# Patient Record
Sex: MALE | HISPANIC OR LATINO | Employment: PART TIME | URBAN - NONMETROPOLITAN AREA
[De-identification: names, ages, dates, MRNs, and addresses within clinical notes are randomized per-mention and may not be internally consistent; named-entity substitution may affect disease eponyms.]

---

## 2022-06-06 PROCEDURE — 96374 THER/PROPH/DIAG INJ IV PUSH: CPT

## 2022-06-06 PROCEDURE — 99283 EMERGENCY DEPT VISIT LOW MDM: CPT

## 2022-06-06 PROCEDURE — 96375 TX/PRO/DX INJ NEW DRUG ADDON: CPT

## 2022-06-07 ENCOUNTER — HOSPITAL ENCOUNTER (EMERGENCY)
Facility: HOSPITAL | Age: 32
Discharge: HOME | End: 2022-06-07
Attending: EMERGENCY MEDICINE | Admitting: EMERGENCY MEDICINE

## 2022-06-07 ENCOUNTER — APPOINTMENT (OUTPATIENT)
Dept: CT IMAGING | Facility: HOSPITAL | Age: 32
End: 2022-06-07

## 2022-06-07 VITALS
HEIGHT: 65 IN | SYSTOLIC BLOOD PRESSURE: 121 MMHG | WEIGHT: 130 LBS | RESPIRATION RATE: 16 BRPM | OXYGEN SATURATION: 99 % | TEMPERATURE: 97.8 F | BODY MASS INDEX: 21.66 KG/M2 | DIASTOLIC BLOOD PRESSURE: 79 MMHG | HEART RATE: 82 BPM

## 2022-06-07 DIAGNOSIS — K21.9 GASTROESOPHAGEAL REFLUX DISEASE, UNSPECIFIED WHETHER ESOPHAGITIS PRESENT: Primary | ICD-10-CM

## 2022-06-07 DIAGNOSIS — K64.9 HEMORRHOIDS, UNSPECIFIED HEMORRHOID TYPE: ICD-10-CM

## 2022-06-07 DIAGNOSIS — K59.00 CONSTIPATION, UNSPECIFIED CONSTIPATION TYPE: ICD-10-CM

## 2022-06-07 LAB
ALBUMIN SERPL-MCNC: 4.6 G/DL (ref 3.5–5.2)
ALBUMIN/GLOB SERPL: 1.5 G/DL
ALP SERPL-CCNC: 88 U/L (ref 39–117)
ALT SERPL W P-5'-P-CCNC: 27 U/L (ref 1–41)
ANION GAP SERPL CALCULATED.3IONS-SCNC: 12 MMOL/L (ref 5–15)
AST SERPL-CCNC: 22 U/L (ref 1–40)
BACTERIA UR QL AUTO: ABNORMAL /HPF
BASOPHILS # BLD AUTO: 0.05 10*3/MM3 (ref 0–0.2)
BASOPHILS NFR BLD AUTO: 0.5 % (ref 0–1.5)
BILIRUB SERPL-MCNC: 0.2 MG/DL (ref 0–1.2)
BILIRUB UR QL STRIP: NEGATIVE
BUN SERPL-MCNC: 14 MG/DL (ref 6–20)
BUN/CREAT SERPL: 16.9 (ref 7–25)
CALCIUM SPEC-SCNC: 9.6 MG/DL (ref 8.6–10.5)
CHLORIDE SERPL-SCNC: 105 MMOL/L (ref 98–107)
CLARITY UR: CLEAR
CO2 SERPL-SCNC: 25 MMOL/L (ref 22–29)
COLOR UR: YELLOW
CREAT SERPL-MCNC: 0.83 MG/DL (ref 0.76–1.27)
DEPRECATED RDW RBC AUTO: 40.4 FL (ref 37–54)
EGFRCR SERPLBLD CKD-EPI 2021: 121.5 ML/MIN/1.73
EOSINOPHIL # BLD AUTO: 0.19 10*3/MM3 (ref 0–0.4)
EOSINOPHIL NFR BLD AUTO: 2.1 % (ref 0.3–6.2)
ERYTHROCYTE [DISTWIDTH] IN BLOOD BY AUTOMATED COUNT: 12.5 % (ref 12.3–15.4)
GLOBULIN UR ELPH-MCNC: 3.1 GM/DL
GLUCOSE SERPL-MCNC: 98 MG/DL (ref 65–99)
GLUCOSE UR STRIP-MCNC: NEGATIVE MG/DL
HCT VFR BLD AUTO: 43 % (ref 37.5–51)
HGB BLD-MCNC: 14.8 G/DL (ref 13–17.7)
HGB UR QL STRIP.AUTO: ABNORMAL
HOLD SPECIMEN: NORMAL
HOLD SPECIMEN: NORMAL
HYALINE CASTS UR QL AUTO: ABNORMAL /LPF
IMM GRANULOCYTES # BLD AUTO: 0.05 10*3/MM3 (ref 0–0.05)
IMM GRANULOCYTES NFR BLD AUTO: 0.5 % (ref 0–0.5)
KETONES UR QL STRIP: NEGATIVE
LEUKOCYTE ESTERASE UR QL STRIP.AUTO: NEGATIVE
LIPASE SERPL-CCNC: 30 U/L (ref 13–60)
LYMPHOCYTES # BLD AUTO: 2.78 10*3/MM3 (ref 0.7–3.1)
LYMPHOCYTES NFR BLD AUTO: 30.1 % (ref 19.6–45.3)
MCH RBC QN AUTO: 30.3 PG (ref 26.6–33)
MCHC RBC AUTO-ENTMCNC: 34.4 G/DL (ref 31.5–35.7)
MCV RBC AUTO: 87.9 FL (ref 79–97)
MONOCYTES # BLD AUTO: 0.82 10*3/MM3 (ref 0.1–0.9)
MONOCYTES NFR BLD AUTO: 8.9 % (ref 5–12)
NEUTROPHILS NFR BLD AUTO: 5.36 10*3/MM3 (ref 1.7–7)
NEUTROPHILS NFR BLD AUTO: 57.9 % (ref 42.7–76)
NITRITE UR QL STRIP: NEGATIVE
NRBC BLD AUTO-RTO: 0 /100 WBC (ref 0–0.2)
PH UR STRIP.AUTO: <=5 [PH] (ref 5–9)
PLATELET # BLD AUTO: 282 10*3/MM3 (ref 140–450)
PMV BLD AUTO: 9.7 FL (ref 6–12)
POTASSIUM SERPL-SCNC: 3.7 MMOL/L (ref 3.5–5.2)
PROT SERPL-MCNC: 7.7 G/DL (ref 6–8.5)
PROT UR QL STRIP: NEGATIVE
RBC # BLD AUTO: 4.89 10*6/MM3 (ref 4.14–5.8)
RBC # UR STRIP: ABNORMAL /HPF
REF LAB TEST METHOD: ABNORMAL
SODIUM SERPL-SCNC: 142 MMOL/L (ref 136–145)
SP GR UR STRIP: 1.02 (ref 1–1.03)
SQUAMOUS #/AREA URNS HPF: ABNORMAL /HPF
UROBILINOGEN UR QL STRIP: ABNORMAL
WBC # UR STRIP: ABNORMAL /HPF
WBC NRBC COR # BLD: 9.25 10*3/MM3 (ref 3.4–10.8)
WHOLE BLOOD HOLD COAG: NORMAL
WHOLE BLOOD HOLD SPECIMEN: NORMAL

## 2022-06-07 PROCEDURE — 85025 COMPLETE CBC W/AUTO DIFF WBC: CPT

## 2022-06-07 PROCEDURE — 25010000002 MORPHINE PER 10 MG: Performed by: EMERGENCY MEDICINE

## 2022-06-07 PROCEDURE — 25010000002 IOPAMIDOL 61 % SOLUTION: Performed by: EMERGENCY MEDICINE

## 2022-06-07 PROCEDURE — 83690 ASSAY OF LIPASE: CPT

## 2022-06-07 PROCEDURE — 96375 TX/PRO/DX INJ NEW DRUG ADDON: CPT

## 2022-06-07 PROCEDURE — 74177 CT ABD & PELVIS W/CONTRAST: CPT

## 2022-06-07 PROCEDURE — 80053 COMPREHEN METABOLIC PANEL: CPT

## 2022-06-07 PROCEDURE — 81001 URINALYSIS AUTO W/SCOPE: CPT | Performed by: EMERGENCY MEDICINE

## 2022-06-07 PROCEDURE — 25010000002 ONDANSETRON PER 1 MG: Performed by: EMERGENCY MEDICINE

## 2022-06-07 RX ORDER — HYDROCORTISONE ACETATE 25 MG/1
25 SUPPOSITORY RECTAL 2 TIMES DAILY
Qty: 10 SUPPOSITORY | Refills: 0 | Status: SHIPPED | OUTPATIENT
Start: 2022-06-07 | End: 2023-02-28

## 2022-06-07 RX ORDER — PANTOPRAZOLE SODIUM 40 MG/10ML
80 INJECTION, POWDER, LYOPHILIZED, FOR SOLUTION INTRAVENOUS ONCE
Status: COMPLETED | OUTPATIENT
Start: 2022-06-07 | End: 2022-06-07

## 2022-06-07 RX ORDER — SODIUM CHLORIDE 0.9 % (FLUSH) 0.9 %
10 SYRINGE (ML) INJECTION AS NEEDED
Status: DISCONTINUED | OUTPATIENT
Start: 2022-06-07 | End: 2022-06-07 | Stop reason: HOSPADM

## 2022-06-07 RX ORDER — SUCRALFATE 1 G/1
1 TABLET ORAL 4 TIMES DAILY
Qty: 28 TABLET | Refills: 0 | Status: SHIPPED | OUTPATIENT
Start: 2022-06-07 | End: 2022-06-14

## 2022-06-07 RX ORDER — ONDANSETRON 2 MG/ML
4 INJECTION INTRAMUSCULAR; INTRAVENOUS ONCE
Status: COMPLETED | OUTPATIENT
Start: 2022-06-07 | End: 2022-06-07

## 2022-06-07 RX ADMIN — ONDANSETRON 4 MG: 2 INJECTION INTRAMUSCULAR; INTRAVENOUS at 02:53

## 2022-06-07 RX ADMIN — MORPHINE SULFATE 4 MG: 4 INJECTION, SOLUTION INTRAMUSCULAR; INTRAVENOUS at 02:53

## 2022-06-07 RX ADMIN — PANTOPRAZOLE SODIUM 80 MG: 40 INJECTION, POWDER, FOR SOLUTION INTRAVENOUS at 06:24

## 2022-06-07 RX ADMIN — IOPAMIDOL 90 ML: 612 INJECTION, SOLUTION INTRAVENOUS at 03:12

## 2022-06-07 NOTE — ED PROVIDER NOTES
Subjective   31-year-old male presents to the emergency department with complaint of epigastric abdominal pain, constipation, intermittent blood in the stool.  He is Greek-speaking and there is some difficulty with interpretation.  He has been seen for this previously and he does have some medications in the room that he apparently got from his home country that appear to be acid reflux type of medications.  He reports he had some rectal pain that was a swollen area that burst open.  He denies any fevers.  He denies any vomiting.    Family history, surgical history, social history, current medications and allergies are reviewed with the patient and triage documentation and vitals are reviewed.      History provided by:  Patient and friend   used: Yes        Review of Systems   Constitutional: Negative for chills and fever.   HENT: Negative for congestion and sore throat.    Eyes: Negative for photophobia and visual disturbance.   Respiratory: Negative for cough, shortness of breath and wheezing.    Cardiovascular: Negative for chest pain and palpitations.   Gastrointestinal: Positive for abdominal pain, blood in stool, constipation, nausea and rectal pain. Negative for abdominal distention, diarrhea and vomiting.   Endocrine: Negative for polydipsia, polyphagia and polyuria.   Genitourinary: Negative for dysuria, frequency and urgency.   Musculoskeletal: Negative for arthralgias, back pain, myalgias and neck pain.   Skin: Negative for rash and wound.   Allergic/Immunologic: Negative.    Neurological: Negative.    Hematological: Negative.    Psychiatric/Behavioral: Negative.        No past medical history on file.    No Known Allergies    No past surgical history on file.    No family history on file.    Social History     Socioeconomic History   • Marital status: Single           Objective   Physical Exam  Vitals and nursing note reviewed.   Constitutional:       General: He is not in acute  distress.     Appearance: He is well-developed and normal weight. He is not ill-appearing, toxic-appearing or diaphoretic.   HENT:      Head: Normocephalic.      Mouth/Throat:      Mouth: Mucous membranes are moist.   Eyes:      General: No scleral icterus.     Pupils: Pupils are equal, round, and reactive to light.   Cardiovascular:      Rate and Rhythm: Normal rate and regular rhythm.      Heart sounds: Normal heart sounds. No murmur heard.  Pulmonary:      Effort: Pulmonary effort is normal. No respiratory distress.      Breath sounds: Normal breath sounds. No wheezing.   Abdominal:      General: Bowel sounds are normal. There is no distension.      Palpations: Abdomen is soft. There is no hepatomegaly or splenomegaly.      Tenderness: There is generalized abdominal tenderness. There is no guarding or rebound. Negative signs include Edwards's sign and McBurney's sign.      Hernia: No hernia is present.   Skin:     General: Skin is warm and dry.      Capillary Refill: Capillary refill takes less than 2 seconds.   Neurological:      General: No focal deficit present.      Mental Status: He is alert and oriented to person, place, and time.   Psychiatric:         Mood and Affect: Mood normal.         Behavior: Behavior normal.         Procedures  none         ED Course      Labs Reviewed   URINALYSIS W/ MICROSCOPIC IF INDICATED (NO CULTURE) - Abnormal; Notable for the following components:       Result Value    Blood, UA Moderate (2+) (*)     All other components within normal limits   URINALYSIS, MICROSCOPIC ONLY - Abnormal; Notable for the following components:    RBC, UA 3-5 (*)     All other components within normal limits   LIPASE - Normal   CBC WITH AUTO DIFFERENTIAL - Normal   RAINBOW DRAW    Narrative:     The following orders were created for panel order [place] Draw.  Procedure                               Abnormality         Status                     ---------                               -----------          ------                     Green Top (Gel)[[id number]]                                  Final result               Lavender Top[[id number]]                                     Final result               Gold Top - SST[[id number]]                                   Final result               Light Blue Top[[id number]]                                   Final result                 Please view results for these tests on the individual orders.   COMPREHENSIVE METABOLIC PANEL    Narrative:     GFR Normal >60  Chronic Kidney Disease <60  Kidney Failure <15     CBC AND DIFFERENTIAL    Narrative:     The following orders were created for panel order CBC & Differential.  Procedure                               Abnormality         Status                     ---------                               -----------         ------                     CBC Auto Differential[[id number]]        Normal              Final result                 Please view results for these tests on the individual orders.   GREEN TOP   LAVENDER TOP   GOLD TOP - SST   LIGHT BLUE TOP     US Abdomen Complete    Result Date: 5/17/2022  Narrative: Indication:  Abdominal pain, hematuria. Sonogram of the Complete Abdomen:  The pancreas is obscured by gas; no abnormality in the region of the pancreatic head or neck. The aorta has a diameter of 1.3 cm.  The IVC has a normal appearance.  The liver has a homogeneous echo pattern and no focal abnormality.  The gallbladder is adequately distended and no stones or other abnormality.  The common duct measures 4.0 mm.  The spleen is obscured by gas.  The kidneys do not have any masses or hydronephrosis.  The bladder is adequately distended and has a normal appearance.    Impression: Negative.  In particular, the kidneys are unremarkable and no bladder abnormality. [id number]    CT Abdomen Pelvis With Contrast    Result Date: 6/7/2022  Narrative: EXAM: CT Abdomen and Pelvis with contrast INDICATION:  abd pain,  constipation, hemmroids and bleeding TECHNIQUE: Helical CT of the abdomen and pelvis was obtained from the diaphragm through the ischial tuberosities using contrast. Axial, coronal and sagittal images were obtained. Dose reduction techniques were used including automated exposure control and/or adjustment of the mA and/or kV according to patient size. COMPARISON: None available FINDINGS: LUNG BASES: 4 mm left basilar pulmonary nodule. STOMACH: No significant finding. LIVER: No significant abnormality. BILIARY: No significant abnormality. PANCREAS: No significant abnormality. SPLEEN: No significant abnormality. ADRENAL GLANDS: No significant abnormality. KIDNEYS:  No significant abnormality. BLADDER: No significant abnormality. VESSELS: Nonaneurysmal abdominal aorta. LYMPH NODES: No enlarged abdominal or pelvic lymph nodes. GI TRACT: No significant abnormality. PERITONEUM: No ascites or pneumoperitoneum. ABDOMINAL WALL: No significant abnormality. OTHER PELVIC: No free pelvic fluid. BONES/SPINE: No acute abnormality.     Impression: No acute findings in the abdomen or pelvis. INCIDENTAL FINDINGS: None requiring follow-up.  Electronically signed by:  Patrick Baugh MD  6/7/2022 5:18 AM CDT Workstation: [id number]          MDM  Number of Diagnoses or Management Options     Amount and/or Complexity of Data Reviewed  Clinical lab tests: reviewed  Tests in the radiology section of CPT®: reviewed    Patient Progress  Patient progress: stable    Labs unremarkable.  CT nonacute.  Advised on symptomatic treatment and close outpatient follow-up.    Final diagnoses:   Gastroesophageal reflux disease, unspecified whether esophagitis present   Constipation, unspecified constipation type   Hemorrhoids, unspecified hemorrhoid type       ED Disposition  ED Disposition     ED Disposition   Discharge    Condition   Stable    Comment   --             [facility] - FAMILY MED[facility]  [facility]  [address] [id number] [phone]  Call today           Medication List      New Prescriptions    hydrocortisone 25 MG suppository  Commonly known as: ANUSOL-HC  Insert 1 suppository into the rectum 2 (Two) Times a Day.     sucralfate 1 g tablet  Commonly known as: CARAFATE  Take 1 tablet by mouth 4 (Four) Times a Day for 7 days.           Where to Get Your Medications      These medications were sent to [id number] - [place], [place] - [address] [place] [address] AT [address] - [phone] [facility] [phone] [address] [address]    Phone: [phone]   · hydrocortisone 25 MG suppository  · sucralfate 1 g tablet          Jn Madden,   06/13/22 0536

## 2022-06-07 NOTE — ED NOTES
Pt was seen a month ago for constipation. Since taking the medication given the patient has had diarrhea, burning when he eats, and colics on lower left abdomen. Pt states lump on anus that kept growing and has since popped. Pt feels like the lumps are inside now. Pt states that even though there is diarrhea it is still hard to go and he has to strain very hard. Pt also states there has been blood in his urine. Pt sees PCP June 15th to check the blood in urine.

## 2022-06-07 NOTE — DISCHARGE INSTRUCTIONS
Please return with new or worsening symptoms.  Medication sent to pharmacy to help with symptoms.  Follow closely with primary care for further evaluation.

## 2022-06-20 ENCOUNTER — OFFICE VISIT (OUTPATIENT)
Dept: GASTROENTEROLOGY | Facility: CLINIC | Age: 32
End: 2022-06-20

## 2022-06-20 VITALS — BODY MASS INDEX: 21.79 KG/M2 | WEIGHT: 130.8 LBS | HEIGHT: 65 IN

## 2022-06-20 DIAGNOSIS — K62.5 HEMORRHAGE OF ANUS AND RECTUM: ICD-10-CM

## 2022-06-20 DIAGNOSIS — R19.7 DIARRHEA, UNSPECIFIED TYPE: ICD-10-CM

## 2022-06-20 DIAGNOSIS — R11.0 NAUSEA: ICD-10-CM

## 2022-06-20 DIAGNOSIS — R10.84 GENERALIZED ABDOMINAL PAIN: Primary | ICD-10-CM

## 2022-06-20 PROCEDURE — 99204 OFFICE O/P NEW MOD 45 MIN: CPT | Performed by: INTERNAL MEDICINE

## 2022-06-20 RX ORDER — SUCRALFATE 1 G/1
1 TABLET ORAL 4 TIMES DAILY
COMMUNITY
End: 2023-03-15

## 2022-06-20 RX ORDER — DICYCLOMINE HCL 20 MG
20 TABLET ORAL EVERY 6 HOURS
Qty: 120 TABLET | Refills: 5 | Status: SHIPPED | OUTPATIENT
Start: 2022-06-20 | End: 2022-07-20

## 2022-06-20 RX ORDER — DEXTROSE AND SODIUM CHLORIDE 5; .45 G/100ML; G/100ML
30 INJECTION, SOLUTION INTRAVENOUS CONTINUOUS PRN
Status: CANCELLED | OUTPATIENT
Start: 2022-07-12

## 2022-06-20 RX ORDER — OMEPRAZOLE 40 MG/1
40 CAPSULE, DELAYED RELEASE ORAL DAILY
Qty: 30 CAPSULE | Refills: 11 | Status: SHIPPED | OUTPATIENT
Start: 2022-06-20 | End: 2023-03-15

## 2022-06-20 NOTE — PROGRESS NOTES
[facility]      Chief Complaint:   Chief Complaint   Patient presents with   • Abdominal Pain     After meals is worse    • Constipation   • Heartburn       Subjective     HPI:   Mr. Gutierrez is a 31-year-old  male with past medical history of alcohol abuse presenting for evaluation for abdominal pain with nausea and change in bowel habits.  He has intermittent bouts of generalized abdominal discomfort for past 2 months associated with the nausea, constipation alternating with diarrhea and rectal bleeding for past 2 months.  He denied vomiting and has 8 pound weight loss in past 2 months.  Currently using Anusol and Carafate without much help.  Denied NSAID usage.  Denied recent alcohol usage.  Family history was unremarkable for IBD and colorectal neoplasia.    Past Medical History:   History reviewed. No pertinent past medical history.    Past Surgical History:  History reviewed. No pertinent surgical history.    Family History:  Family History   Problem Relation Age of Onset   • Alcohol abuse Father        Social History:   reports that he has never smoked. He has never used smokeless tobacco. Drug use questions deferred to the physician. He reports that he does not drink alcohol.    Medications:   Prior to Admission medications    Medication Sig Start Date End Date Taking? Authorizing Provider   hydrocortisone (ANUSOL-HC) 25 MG suppository Insert 1 suppository into the rectum 2 (Two) Times a Day. 6/7/22  Yes Jn Madden,    sucralfate (CARAFATE) 1 g tablet Take 1 g by mouth 4 (Four) Times a Day.   Yes Provider, MD Juwan   dicyclomine (BENTYL) 20 MG tablet Take 1 tablet by mouth Every 6 (Six) Hours for 30 days. 6/20/22 7/20/22  Amanda Mcclellan MD   omeprazole (priLOSEC) 40 MG capsule Take 1 capsule by mouth Daily. 6/20/22   Amanda Mcclellan MD   polyethylene glycol (GoLYTELY) 236 g solution Please use the instructions given in office 6/20/22   Amanda Mcclellan  "MD       Allergies:  Patient has no known allergies.    ROS:    Review of Systems   Constitutional: Positive for unexpected weight change. Negative for chills, fatigue and fever.   HENT: Negative for congestion, ear discharge, hearing loss, nosebleeds and sore throat.    Eyes: Negative for pain, discharge and redness.   Respiratory: Negative for cough, chest tightness, shortness of breath and wheezing.    Cardiovascular: Negative for chest pain and palpitations.   Gastrointestinal: Positive for abdominal pain, blood in stool, constipation, diarrhea, nausea and vomiting. Negative for abdominal distention.   Endocrine: Negative for cold intolerance, polydipsia, polyphagia and polyuria.   Genitourinary: Negative for dysuria, flank pain, frequency, hematuria and urgency.   Musculoskeletal: Negative for arthralgias, back pain, joint swelling and myalgias.   Skin: Negative for color change, pallor and rash.   Neurological: Negative for tremors, seizures, syncope, weakness and headaches.   Hematological: Negative for adenopathy. Does not bruise/bleed easily.   Psychiatric/Behavioral: Negative for behavioral problems, confusion, dysphoric mood, hallucinations and suicidal ideas. The patient is not nervous/anxious.      Objective     Height 165.1 cm (65\"), weight 59.3 kg (130 lb 12.8 oz).    Physical Exam  Constitutional:       Appearance: He is well-developed.   HENT:      Head: Normocephalic and atraumatic.   Eyes:      Conjunctiva/sclera: Conjunctivae normal.      Pupils: Pupils are equal, round, and reactive to light.   Neck:      Thyroid: No thyromegaly.   Cardiovascular:      Rate and Rhythm: Normal rate and regular rhythm.      Heart sounds: Normal heart sounds. No murmur heard.  Pulmonary:      Effort: Pulmonary effort is normal.      Breath sounds: Normal breath sounds. No wheezing.   Abdominal:      General: Bowel sounds are normal. There is no distension.      Palpations: Abdomen is soft. There is no mass.      " Tenderness: There is no abdominal tenderness.      Hernia: No hernia is present.   Genitourinary:     Comments: No lesions noted  Musculoskeletal:         General: No tenderness. Normal range of motion.      Cervical back: Normal range of motion and neck supple.   Lymphadenopathy:      Cervical: No cervical adenopathy.   Skin:     General: Skin is warm and dry.      Findings: No rash.   Neurological:      Mental Status: He is alert and oriented to person, place, and time.      Cranial Nerves: No cranial nerve deficit.   Psychiatric:         Thought Content: Thought content normal.        Extremities: No edema, cyanosis or clubbing.    Assessment & Plan    1.  Abdominal pain with nausea and weight loss rule out peptic ulcer disease, gastritis and pancreaticobiliary pathology.  Add Prilosec 40 mg p.o. daily.  Continue Carafate.  Proceed with EGD for further evaluation.  2.  Abdominal pain with diarrhea alternating with constipation, rectal bleeding and weight loss rule out IBD and colorectal neoplasia.  Add Bentyl and Metamucil daily.  Add Preparation H suppositories as needed.  Proceed with colonoscopy for further evaluation.  3.  History of alcohol abuse, off currently.  4.  Weight loss, add p.o. nutritional supplements.  Diagnoses and all orders for this visit:    1. Generalized abdominal pain (Primary)  -     Case Request; Standing  -     Case Request    2. Diarrhea, unspecified type  -     Case Request; Standing  -     Case Request    3. Nausea  -     Case Request; Standing  -     Case Request    4. Hemorrhage of anus and rectum  -     Case Request; Standing  -     Case Request    Other orders  -     polyethylene glycol (GoLYTELY) 236 g solution; Please use the instructions given in office  Dispense: 4000 mL; Refill: 0  -     Follow Anesthesia Guidelines / Standing Orders; Future  -     Obtain Informed Consent; Future  -     dicyclomine (BENTYL) 20 MG tablet; Take 1 tablet by mouth Every 6 (Six) Hours for 30  days.  Dispense: 120 tablet; Refill: 5  -     omeprazole (priLOSEC) 40 MG capsule; Take 1 capsule by mouth Daily.  Dispense: 30 capsule; Refill: 11        ESOPHAGOGASTRODUODENOSCOPY (N/A), COLONOSCOPY (N/A)     Diagnosis Plan   1. Generalized abdominal pain  Case Request    Case Request   2. Diarrhea, unspecified type  Case Request    Case Request   3. Nausea  Case Request    Case Request   4. Hemorrhage of anus and rectum  Case Request    Case Request       Anticipated Surgical Procedure:  Orders Placed This Encounter   Procedures   • Follow Anesthesia Guidelines / Standing Orders     Standing Status:   Future   • Obtain Informed Consent     Standing Status:   Future     Order Specific Question:   Informed Consent Given For     Answer:   egd and colonoscopy       The risks, benefits, and alternatives of this procedure have been discussed with the patient or the responsible party- the patient understands and agrees to proceed.            This document has been electronically signed by Amanda Mcclellan MD on June 20, 2022 15:24 CDT

## 2022-07-12 ENCOUNTER — HOSPITAL ENCOUNTER (OUTPATIENT)
Facility: HOSPITAL | Age: 32
Setting detail: HOSPITAL OUTPATIENT SURGERY
Discharge: HOME | End: 2022-07-12
Attending: INTERNAL MEDICINE | Admitting: INTERNAL MEDICINE

## 2022-07-12 ENCOUNTER — ANESTHESIA EVENT (OUTPATIENT)
Dept: GASTROENTEROLOGY | Facility: HOSPITAL | Age: 32
End: 2022-07-12

## 2022-07-12 ENCOUNTER — ANESTHESIA (OUTPATIENT)
Dept: GASTROENTEROLOGY | Facility: HOSPITAL | Age: 32
End: 2022-07-12

## 2022-07-12 VITALS
TEMPERATURE: 96.9 F | BODY MASS INDEX: 21.52 KG/M2 | HEIGHT: 65 IN | OXYGEN SATURATION: 96 % | HEART RATE: 83 BPM | DIASTOLIC BLOOD PRESSURE: 59 MMHG | SYSTOLIC BLOOD PRESSURE: 106 MMHG | RESPIRATION RATE: 18 BRPM | WEIGHT: 129.19 LBS

## 2022-07-12 DIAGNOSIS — R10.84 GENERALIZED ABDOMINAL PAIN: ICD-10-CM

## 2022-07-12 DIAGNOSIS — R19.7 DIARRHEA, UNSPECIFIED TYPE: ICD-10-CM

## 2022-07-12 DIAGNOSIS — K62.5 HEMORRHAGE OF ANUS AND RECTUM: ICD-10-CM

## 2022-07-12 DIAGNOSIS — R11.0 NAUSEA: ICD-10-CM

## 2022-07-12 PROCEDURE — 43239 EGD BIOPSY SINGLE/MULTIPLE: CPT | Performed by: INTERNAL MEDICINE

## 2022-07-12 PROCEDURE — 45380 COLONOSCOPY AND BIOPSY: CPT | Performed by: INTERNAL MEDICINE

## 2022-07-12 PROCEDURE — 25010000002 PROPOFOL 10 MG/ML EMULSION: Performed by: NURSE ANESTHETIST, CERTIFIED REGISTERED

## 2022-07-12 RX ORDER — DEXTROSE AND SODIUM CHLORIDE 5; .45 G/100ML; G/100ML
30 INJECTION, SOLUTION INTRAVENOUS CONTINUOUS PRN
Status: DISCONTINUED | OUTPATIENT
Start: 2022-07-12 | End: 2022-07-12 | Stop reason: HOSPADM

## 2022-07-12 RX ORDER — PROPOFOL 10 MG/ML
VIAL (ML) INTRAVENOUS AS NEEDED
Status: DISCONTINUED | OUTPATIENT
Start: 2022-07-12 | End: 2022-07-12 | Stop reason: SURG

## 2022-07-12 RX ORDER — ONDANSETRON 2 MG/ML
4 INJECTION INTRAMUSCULAR; INTRAVENOUS ONCE AS NEEDED
Status: DISCONTINUED | OUTPATIENT
Start: 2022-07-12 | End: 2022-07-12 | Stop reason: HOSPADM

## 2022-07-12 RX ORDER — LIDOCAINE HYDROCHLORIDE 20 MG/ML
INJECTION, SOLUTION INTRAVENOUS AS NEEDED
Status: DISCONTINUED | OUTPATIENT
Start: 2022-07-12 | End: 2022-07-12 | Stop reason: SURG

## 2022-07-12 RX ADMIN — PROPOFOL 60 MG: 10 INJECTION, EMULSION INTRAVENOUS at 13:33

## 2022-07-12 RX ADMIN — DEXTROSE AND SODIUM CHLORIDE 30 ML/HR: 5; 450 INJECTION, SOLUTION INTRAVENOUS at 13:13

## 2022-07-12 RX ADMIN — PROPOFOL 30 MG: 10 INJECTION, EMULSION INTRAVENOUS at 13:38

## 2022-07-12 RX ADMIN — PROPOFOL 40 MG: 10 INJECTION, EMULSION INTRAVENOUS at 13:34

## 2022-07-12 RX ADMIN — PROPOFOL 40 MG: 10 INJECTION, EMULSION INTRAVENOUS at 13:36

## 2022-07-12 RX ADMIN — PROPOFOL 30 MG: 10 INJECTION, EMULSION INTRAVENOUS at 13:31

## 2022-07-12 RX ADMIN — PROPOFOL 20 MG: 10 INJECTION, EMULSION INTRAVENOUS at 13:41

## 2022-07-12 RX ADMIN — PROPOFOL 40 MG: 10 INJECTION, EMULSION INTRAVENOUS at 13:40

## 2022-07-12 RX ADMIN — LIDOCAINE HYDROCHLORIDE 80 MG: 20 INJECTION, SOLUTION INTRAVENOUS at 13:29

## 2022-07-12 RX ADMIN — PROPOFOL 120 MG: 10 INJECTION, EMULSION INTRAVENOUS at 13:29

## 2022-07-12 NOTE — INTERVAL H&P NOTE
Chief Complaint:        Chief Complaint   Patient presents with   • Abdominal Pain       After meals is worse    • Constipation   • Heartburn            Subjective         HPI:   Mr. Gutierrez is a 31-year-old  male with past medical history of alcohol abuse presenting for evaluation for abdominal pain with nausea and change in bowel habits.  He has intermittent bouts of generalized abdominal discomfort for past 2 months associated with the nausea, constipation alternating with diarrhea and rectal bleeding for past 2 months.  He denied vomiting and has 8 pound weight loss in past 2 months.  Currently using Anusol and Carafate without much help.  Denied NSAID usage.  Denied recent alcohol usage.  Family history was unremarkable for IBD and colorectal neoplasia.     Past Medical History:   Medical History   History reviewed. No pertinent past medical history.        Past Surgical History:  Surgical History   History reviewed. No pertinent surgical history.        Family History:        Family History   Problem Relation Age of Onset   • Alcohol abuse Father           Social History:   reports that he has never smoked. He has never used smokeless tobacco. Drug use questions deferred to the physician. He reports that he does not drink alcohol.     Medications:           Prior to Admission medications    Medication Sig Start Date End Date Taking? Authorizing Provider   hydrocortisone (ANUSOL-HC) 25 MG suppository Insert 1 suppository into the rectum 2 (Two) Times a Day. 6/7/22   Yes Jn Madden,    sucralfate (CARAFATE) 1 g tablet Take 1 g by mouth 4 (Four) Times a Day.     Yes Provider, MD Juwan   dicyclomine (BENTYL) 20 MG tablet Take 1 tablet by mouth Every 6 (Six) Hours for 30 days. 6/20/22 7/20/22   Amanda Mcclellna MD   omeprazole (priLOSEC) 40 MG capsule Take 1 capsule by mouth Daily. 6/20/22     Amanda Mcclellan MD   polyethylene glycol (GoLYTELY) 236 g solution Please use the instructions  "given in office 6/20/22     Amanda Mcclellan MD         Allergies:  Patient has no known allergies.     ROS:    Review of Systems   Constitutional: Positive for unexpected weight change. Negative for chills, fatigue and fever.   HENT: Negative for congestion, ear discharge, hearing loss, nosebleeds and sore throat.    Eyes: Negative for pain, discharge and redness.   Respiratory: Negative for cough, chest tightness, shortness of breath and wheezing.    Cardiovascular: Negative for chest pain and palpitations.   Gastrointestinal: Positive for abdominal pain, blood in stool, constipation, diarrhea, nausea and vomiting. Negative for abdominal distention.   Endocrine: Negative for cold intolerance, polydipsia, polyphagia and polyuria.   Genitourinary: Negative for dysuria, flank pain, frequency, hematuria and urgency.   Musculoskeletal: Negative for arthralgias, back pain, joint swelling and myalgias.   Skin: Negative for color change, pallor and rash.   Neurological: Negative for tremors, seizures, syncope, weakness and headaches.   Hematological: Negative for adenopathy. Does not bruise/bleed easily.   Psychiatric/Behavioral: Negative for behavioral problems, confusion, dysphoric mood, hallucinations and suicidal ideas. The patient is not nervous/anxious.             Objective         Height 165.1 cm (65\"), weight 59.3 kg (130 lb 12.8 oz).     Physical Exam  Constitutional:       Appearance: He is well-developed.   HENT:      Head: Normocephalic and atraumatic.   Eyes:      Conjunctiva/sclera: Conjunctivae normal.      Pupils: Pupils are equal, round, and reactive to light.   Neck:      Thyroid: No thyromegaly.   Cardiovascular:      Rate and Rhythm: Normal rate and regular rhythm.      Heart sounds: Normal heart sounds. No murmur heard.  Pulmonary:      Effort: Pulmonary effort is normal.      Breath sounds: Normal breath sounds. No wheezing.   Abdominal:      General: Bowel sounds are normal. There is no " distension.      Palpations: Abdomen is soft. There is no mass.      Tenderness: There is no abdominal tenderness.      Hernia: No hernia is present.   Genitourinary:     Comments: No lesions noted  Musculoskeletal:         General: No tenderness. Normal range of motion.      Cervical back: Normal range of motion and neck supple.   Lymphadenopathy:      Cervical: No cervical adenopathy.   Skin:     General: Skin is warm and dry.      Findings: No rash.   Neurological:      Mental Status: He is alert and oriented to person, place, and time.      Cranial Nerves: No cranial nerve deficit.   Psychiatric:         Thought Content: Thought content normal.         Extremities: No edema, cyanosis or clubbing.           Assessment & Plan      1.  Abdominal pain with nausea and weight loss rule out peptic ulcer disease, gastritis and pancreaticobiliary pathology.  Add Prilosec 40 mg p.o. daily.  Continue Carafate.  Proceed with EGD for further evaluation.  2.  Abdominal pain with diarrhea alternating with constipation, rectal bleeding and weight loss rule out IBD and colorectal neoplasia.  Add Bentyl and Metamucil daily.  Add Preparation H suppositories as needed.  Proceed with colonoscopy for further evaluation.  3.  History of alcohol abuse, off currently.  4.  Weight loss, add p.o. nutritional supplements.  Diagnoses and all orders for this visit:     1. Generalized abdominal pain (Primary)  -     Case Request; Standing  -     Case Request     2. Diarrhea, unspecified type  -     Case Request; Standing  -     Case Request     3. Nausea  -     Case Request; Standing  -     Case Request     4. Hemorrhage of anus and rectum  -     Case Request; Standing  -     Case Request     Other orders  -     polyethylene glycol (GoLYTELY) 236 g solution; Please use the instructions given in office  Dispense: 4000 mL; Refill: 0  -     Follow Anesthesia Guidelines / Standing Orders; Future  -     Obtain Informed Consent; Future  -      dicyclomine (BENTYL) 20 MG tablet; Take 1 tablet by mouth Every 6 (Six) Hours for 30 days.  Dispense: 120 tablet; Refill: 5  -     omeprazole (priLOSEC) 40 MG capsule; Take 1 capsule by mouth Daily.  Dispense: 30 capsule; Refill: 11           ESOPHAGOGASTRODUODENOSCOPY (N/A), COLONOSCOPY (N/A)       Diagnosis Plan   1. Generalized abdominal pain  Case Request     Case Request   2. Diarrhea, unspecified type  Case Request     Case Request   3. Nausea  Case Request     Case Request   4. Hemorrhage of anus and rectum  Case Request     Case Request         Anticipated Surgical Procedure:        Orders Placed This Encounter   Procedures   • Follow Anesthesia Guidelines / Standing Orders       Standing Status:   Future   • Obtain Informed Consent       Standing Status:   Future       Order Specific Question:   Informed Consent Given For       Answer:   egd and colonoscopy         The risks, benefits, and alternatives of this procedure have been discussed with the patient or the responsible party- the patient understands and agrees to proceed.          H&P reviewed. The patient was examined and there are no changes to the H&P.

## 2022-07-12 NOTE — ANESTHESIA PREPROCEDURE EVALUATION
Anesthesia Evaluation     Patient summary reviewed and Nursing notes reviewed   NPO Solid Status: > 8 hours  NPO Liquid Status: > 6 hours           Airway   Mallampati: III  TM distance: >3 FB  Neck ROM: full  No difficulty expected  Dental - normal exam     Pulmonary - negative pulmonary ROS and normal exam   Cardiovascular - negative cardio ROS and normal exam  Exercise tolerance: good (4-7 METS)        Neuro/Psych- negative ROS  GI/Hepatic/Renal/Endo    (+)  GI bleeding lower ,     Musculoskeletal (-) negative ROS    Abdominal  - normal exam   Substance History - negative use     OB/GYN negative ob/gyn ROS         Other - negative ROS                       Anesthesia Plan    ASA 2     MAC     intravenous induction     Anesthetic plan, risks, benefits, and alternatives have been provided, discussed and informed consent has been obtained with: patient and father.        CODE STATUS:

## 2022-07-12 NOTE — ANESTHESIA POSTPROCEDURE EVALUATION
Patient: Christian Gutierrez    Procedure Summary     Date: 07/12/22 Room / Location: [facility] ENDOSCOPY 2 / [facility] ENDOSCOPY    Anesthesia Start: 1325 Anesthesia Stop: 1342    Procedures:       ESOPHAGOGASTRODUODENOSCOPY (N/A )      COLONOSCOPY (N/A ) Diagnosis:       Generalized abdominal pain      Diarrhea, unspecified type      Nausea      Hemorrhage of anus and rectum      (Generalized abdominal pain [R10.84])      (Diarrhea, unspecified type [R19.7])      (Nausea [R11.0])      (Hemorrhage of anus and rectum [K62.5])    Surgeons: Amanda Mcclellan MD Provider: Katarzyna Rosas CRNA    Anesthesia Type: MAC ASA Status: 2          Anesthesia Type: MAC    Vitals  No vitals data found for the desired time range.          Post Anesthesia Care and Evaluation    Patient location during evaluation: bedside  Patient participation: waiting for patient participation  Pain score: 0  Pain management: adequate    Airway patency: patent  Anesthetic complications: No anesthetic complications  PONV Status: none  Cardiovascular status: acceptable  Respiratory status: acceptable and spontaneous ventilation  Hydration status: acceptable    Comments: ---------------------------               07/12/22                      1343        ---------------------------   BP:          12/66        Pulse:         78           Resp:          16           Temp:   98.3 °F (36.8 °C)   SpO2:          100%         ---------------------------

## 2022-07-15 LAB — REF LAB TEST METHOD: NORMAL

## 2022-07-25 ENCOUNTER — OFFICE VISIT (OUTPATIENT)
Dept: GASTROENTEROLOGY | Facility: CLINIC | Age: 32
End: 2022-07-25

## 2022-07-25 VITALS
DIASTOLIC BLOOD PRESSURE: 85 MMHG | HEART RATE: 95 BPM | WEIGHT: 135.8 LBS | HEIGHT: 65 IN | SYSTOLIC BLOOD PRESSURE: 138 MMHG | BODY MASS INDEX: 22.63 KG/M2

## 2022-07-25 DIAGNOSIS — R19.7 DIARRHEA, UNSPECIFIED TYPE: ICD-10-CM

## 2022-07-25 DIAGNOSIS — R10.84 GENERALIZED ABDOMINAL PAIN: Primary | ICD-10-CM

## 2022-07-25 PROCEDURE — 99214 OFFICE O/P EST MOD 30 MIN: CPT | Performed by: INTERNAL MEDICINE

## 2022-07-25 RX ORDER — LANSOPRAZOLE, AMOXICILLIN, CLARITHROMYCIN 30-500-500
KIT ORAL 2 TIMES DAILY
Qty: 14 EACH | Refills: 0 | Status: SHIPPED | OUTPATIENT
Start: 2022-07-25 | End: 2022-08-08

## 2022-07-25 RX ORDER — LANSOPRAZOLE 30 MG/1
30 CAPSULE, DELAYED RELEASE ORAL 2 TIMES DAILY
Qty: 28 CAPSULE | Refills: 0 | Status: SHIPPED | OUTPATIENT
Start: 2022-07-25 | End: 2022-09-29

## 2022-07-25 RX ORDER — CLARITHROMYCIN 500 MG/1
500 TABLET, COATED ORAL 2 TIMES DAILY
Qty: 28 TABLET | Refills: 0 | Status: SHIPPED | OUTPATIENT
Start: 2022-07-25 | End: 2022-09-29

## 2022-07-25 RX ORDER — DICYCLOMINE HYDROCHLORIDE 10 MG/1
10 CAPSULE ORAL
Qty: 120 CAPSULE | Refills: 6 | Status: SHIPPED | OUTPATIENT
Start: 2022-07-25 | End: 2022-08-24

## 2022-07-25 RX ORDER — AMOXICILLIN 500 MG/1
1000 CAPSULE ORAL 2 TIMES DAILY
Qty: 56 CAPSULE | Refills: 0 | Status: SHIPPED | OUTPATIENT
Start: 2022-07-25 | End: 2022-08-29

## 2022-07-26 ENCOUNTER — TELEPHONE (OUTPATIENT)
Dept: GASTROENTEROLOGY | Facility: CLINIC | Age: 32
End: 2022-07-26

## 2022-07-26 NOTE — TELEPHONE ENCOUNTER
07/26/2022, 1252 - Individual on patient's behalf submitted voice message requesting return telephone call [phone] regarding question pertaining to prescription medications submitted to pharmacy on Monday, July 25, 2022.    Note - Patient requires .    07/26/2022, 1338 - Patient's telephone call returned per this staff member [phone].  Individual on patient's behalf made aware Lansoprazole 30 MG Capsules, 1 capsule by mouth 2 times per day X 14 days, Clarithromycin 500 MG Tablets, 1 tablet by mouth 2 times per day X 14 days, and Amoxicillin 500 MG capsules, 2 capsules by mouth 2 times per day X 14 days, have been submitted to [facility], [place] as treatment of choice per Dr. Amanda Mcclellan M.D. for H. Pylori.  Patient verbalized understanding.

## 2022-08-15 NOTE — PROGRESS NOTES
Chief Complaint   Patient presents with   • Abdominal Pain       Subjective    Herzon Noah Gutierrez is a 32 y.o. male.    History of Present Illness  Patient presented [facility] for follow-up visit today.  Feels much better currently.  Has intermittent symptoms of epigastric pain and diarrhea.  Denies rectal bleeding or weight loss.  EGD was consistent with hiatal hernia, esophagitis and gastritis.  Path was consistent with H. pylori infection.  Colonoscopy was consistent with proctitis and hemorrhoids.  Path was unremarkable.       The following portions of the patient's history were reviewed and updated as appropriate:   No past medical history on file.  Past Surgical History:   Procedure Laterality Date   • COLONOSCOPY N/A 7/12/2022    Procedure: COLONOSCOPY;  Surgeon: Amanda Mcclellan MD;  Location: [facility] ENDOSCOPY;  Service: Gastroenterology;  Laterality: N/A;   • ENDOSCOPY N/A 7/12/2022    Procedure: ESOPHAGOGASTRODUODENOSCOPY;  Surgeon: Amanda Mcclellan MD;  Location: [facility] ENDOSCOPY;  Service: Gastroenterology;  Laterality: N/A;     Family History   Problem Relation Age of Onset   • Alcohol abuse Father        Prior to Admission medications    Medication Sig Start Date End Date Taking? Authorizing Provider   hydrocortisone (ANUSOL-HC) 25 MG suppository Insert 1 suppository into the rectum 2 (Two) Times a Day. 6/7/22  Yes Jn Madden DO   omeprazole (priLOSEC) 40 MG capsule Take 1 capsule by mouth Daily. 6/20/22  Yes Amanda Mcclellan MD   polyethylene glycol (GoLYTELY) 236 g solution Please use the instructions given in office 6/20/22  Yes Amanda Mcclellan MD   sucralfate (CARAFATE) 1 g tablet Take 1 g by mouth 4 (Four) Times a Day.   Yes Juwan Connolly MD   amoxicillin (AMOXIL) 500 MG capsule Take 2 capsules by mouth 2 (Two) Times a Day. 7/25/22   Amanda Mcclellan MD   clarithromycin (BIAXIN) 500 MG tablet Take 1 tablet by mouth 2 (Two) Times a Day. 7/25/22   Eleuterio  "MD Amanda   dicyclomine (Bentyl) 10 MG capsule Take 1 capsule by mouth 4 (Four) Times a Day Before Meals & at Bedtime for 30 days. 7/25/22 8/24/22  Amanda Mcclellan MD   lansoprazole (PREVACID) 30 MG capsule Take 1 capsule by mouth 2 (Two) Times a Day. 7/25/22   Amanda Mcclellan MD     No Known Allergies  Social History     Socioeconomic History   • Marital status: Single   Tobacco Use   • Smoking status: Never Smoker   • Smokeless tobacco: Never Used   Vaping Use   • Vaping Use: Never used   Substance and Sexual Activity   • Alcohol use: Never   • Drug use: Defer   • Sexual activity: Defer       Review of Systems  Review of Systems   Constitutional: Negative for chills, fatigue, fever and unexpected weight change.   HENT: Negative for congestion, ear discharge, hearing loss, nosebleeds and sore throat.    Eyes: Negative for pain, discharge and redness.   Respiratory: Negative for cough, chest tightness, shortness of breath and wheezing.    Cardiovascular: Negative for chest pain and palpitations.   Gastrointestinal: Positive for abdominal pain and diarrhea. Negative for abdominal distention, blood in stool, constipation, nausea and vomiting.   Endocrine: Negative for cold intolerance, polydipsia, polyphagia and polyuria.   Genitourinary: Negative for dysuria, flank pain, frequency, hematuria and urgency.   Musculoskeletal: Negative for arthralgias, back pain, joint swelling and myalgias.   Skin: Negative for color change, pallor and rash.   Neurological: Negative for tremors, seizures, syncope, weakness and headaches.   Hematological: Negative for adenopathy. Does not bruise/bleed easily.   Psychiatric/Behavioral: Negative for behavioral problems, confusion, dysphoric mood, hallucinations and suicidal ideas. The patient is not nervous/anxious.         /85 (BP Location: Left arm)   Pulse 95   Ht 165.1 cm (65\")   Wt 61.6 kg (135 lb 12.8 oz)   BMI 22.60 kg/m²     Objective    Physical " Exam  Constitutional:       Appearance: He is well-developed.   HENT:      Head: Normocephalic and atraumatic.   Eyes:      Conjunctiva/sclera: Conjunctivae normal.      Pupils: Pupils are equal, round, and reactive to light.   Neck:      Thyroid: No thyromegaly.   Cardiovascular:      Rate and Rhythm: Normal rate and regular rhythm.      Heart sounds: Normal heart sounds. No murmur heard.  Pulmonary:      Effort: Pulmonary effort is normal.      Breath sounds: Normal breath sounds. No wheezing.   Abdominal:      General: Bowel sounds are normal. There is no distension.      Palpations: Abdomen is soft. There is no mass.      Tenderness: There is no abdominal tenderness.      Hernia: No hernia is present.   Genitourinary:     Comments: No lesions noted  Musculoskeletal:         General: No tenderness. Normal range of motion.      Cervical back: Normal range of motion and neck supple.   Lymphadenopathy:      Cervical: No cervical adenopathy.   Skin:     General: Skin is warm and dry.      Findings: No rash.   Neurological:      Mental Status: He is alert and oriented to person, place, and time.      Cranial Nerves: No cranial nerve deficit.   Psychiatric:         Thought Content: Thought content normal.       Admission on 2022, Discharged on 2022   Component Date Value Ref Range Status   • Reference Lab Report 2022    Final                    Value:[facility]  [address] [place] [address]  Phone: [phone] or [phone]  Fax: [phone]  Sergio Leyva M.D., Medical Director    PATIENT NAME                           LABORATORY NO.  [address]  NIKKI RAMIREZ         [id number]  [id number]                         AGE              SEX  N           CLIENT REF #  [facility]           1990                    [id number]    [place]                       REQUESTING M.D.     [facility]     COPY TO.  [address]  [place] [address]             HELEN  DATE COLLECTED      DATE RECEIVED      DATE REPORTED  07/12/2022          07/13/2022         07/15/2022    DIAGNOSIS:  A.   SMALL BOWEL, BIOPSY:  Small bowel mucosa with no significant pathologic abnormality  B.   ANTRUM, BIOPSY:  Helicobacter pylori associated chronic gastritis with reactive gastropathy  H. pylori immunohistochemical stain is positive for                           organisms  No intestinal metaplasia or dysplasia identified  C.   GE JUNCTION:  Helicobacter pylori associated chronic gastritis with reactive gastropathy  H. pylori immunohistochemical stain is positive for organisms  No intestinal metaplasia or dysplasia identified  No squamous mucosa identified  D.   TERMINAL ILEUM BIOPSY:  Small bowel mucosa with no significant pathologic abnormality  E.   COLON, BIOPSY:  Colonic mucosa with no significant pathologic abnormality  F.   RECTAL BIOPSY:  Rectal type mucosa with mild reactive changes; otherwise, no significant  pathologic abnormality    CLINICAL HISTORY:  Generalized abdominal pain, diarrhea, nausea, hemorrhage of anus and rectum    SPECIMENS RECEIVED:  A.  SMALL BOWEL, BIOPSY  B.  ANTRUM, BIOPSY  C.  GE JUNCTION  D.  TERMINAL ILEUM BIOPSY  E.  COLON, BIOPSY  F.  RECTAL BIOPSY    MICROSCOPIC DESCRIPTION:  Tissue blocks are prepared and slides are examined microscopically on all  specimens. See diagnosis for details.    The internal and                           external (both positive and negative) controls reacted  appropriately. Some of our immunohistochemical and in situ hybridization  studies are performed as analyte specific reagents. The following statement  applies to those tests: This test was developed, and its performance  characteristics determined by [facility]. It has not been  cleared or approved by the US Food and Drug Administration. However, the  FDA has determined that  "approval and clearance are not necessary.    Professional interpretation rendered by Susan Del Rio D.O., [facility] at  [facility], [employer], [address] [place] [address].    GROSS DESCRIPTION:  A.  Specimen is received in 1 formalin filled container labeled \"small bowel  biopsy\" consists of 2 pieces of tan soft tissue measuring 0.5 x 0.3 x 0.2 cm  in aggregate.  All submitted in 1 cassette.  BKO  B.  Specimen received in 1 formalin filled container labeled \"antrum biopsy\"  consists of 3 pieces of tan soft tissue measuring 0.6 x 0.3                           x 0.2 cm in  aggregate.  All submitted in 1 cassette.  C.  Specimen is received in 1 formalin filled container labeled \"GE junction  biopsy\" consists of 2 pieces of tan soft tissue measuring 0.6 x 0.3 x 0.3 cm  in aggregate.  All submitted in 1 cassette.  D.  Specimen is received in 1 formalin filled container labeled \"terminal ileum  biopsy\" consists of 2 pieces of tan soft tissue measuring 0.7 x 0.3 x 0.2 cm  in aggregate.  All submitted in 1 cassette.  E.  Specimen received in 1 formalin filled container labeled \"colonic mucosa  biopsy\" consists of 3 pieces of tan soft tissue measuring 0.7 x 0.3 x 0.2 cm  in aggregate.  All submitted in 1 cassette.  F.  Specimen received in 1 formalin filled container labeled \"rectum biopsy\"  consists of 2 pieces of tan soft tissue measuring 0.4 x 0.3 x 0.2 cm in  aggregate.  All submitted in 1 cassette.  BKO    REVIEWED, DIAGNOSED AND ELECTRONICALLY  SIGNED BY:    Susan Del Rio D.O., [email or web address].AHalieP.  CPT CODES:  88305x6, 88342x2       Assessment & Plan    No diagnosis found..   1.  Abdominal pain with nausea and weight loss, likely due to H. pylori gastritis.  Add Prevpac for 2 weeks and continue Prilosec 40 mg p.o. daily and Carafate post completion.    2.  Abdominal pain with diarrhea alternating with constipation, rectal bleeding and weight loss, likely due to proctitis.    Continue Bentyl, Metamucil daily and " Preparation H suppositories as needed.   3.  History of alcohol abuse, off currently.  4.  Weight loss, continue p.o. nutritional supplements.    Orders placed during this encounter include:  No orders of the defined types were placed in this encounter.      * Surgery not found *    Review and/or summary of lab tests, radiology, procedures, medications. Review and summary of old records and obtaining of history. The risks and benefits of my recommendations, as well as other treatment options were discussed with the patient today. Questions were answered.    New Medications Ordered This Visit   Medications   • amoxicillin-clarithromycin-lansoprazole (Prevpac) combo pack     Sig: Take  by mouth 2 (Two) Times a Day for 14 days. Follow package directions.     Dispense:  14 each     Refill:  0   • dicyclomine (Bentyl) 10 MG capsule     Sig: Take 1 capsule by mouth 4 (Four) Times a Day Before Meals & at Bedtime for 30 days.     Dispense:  120 capsule     Refill:  6       Follow-up: Return in about 1 month (around 2022).               Results for orders placed or performed during the hospital encounter of 22   TISSUE EXAM, [facility] (ANDRZEJ,COR,MAD)    Specimen: A: Small Intestine, Duodenum; Tissue    B: Gastric, Antrum; Tissue    C: Esophagus; Tissue    D: Small Intestine, Ileum; Tissue    E: Large Intestine; Tissue    F: Large Intestine, Rectum; Tissue   Result Value Ref Range    Reference Lab Report       [facility]  [address] [place] [address]  Phone: [phone] or [phone]  Fax: [phone]  Sergio Leyva M.D., Medical Director    PATIENT NAME                           LABORATORY NO.  [address]  NIKKI RAMIREZI         [id number]  [id number]                         AGE              SEX  SSN           CLIENT REF #  [facility]           1990  GABI                  [id number]    [place]                       REQUESTING M.D.      NATALIA PETERS     COPY TO.  [address] [place] [id number]             HELEN  DATE COLLECTED      DATE RECEIVED      DATE REPORTED  07/12/2022          07/13/2022         07/15/2022    DIAGNOSIS:  A.   SMALL BOWEL, BIOPSY:  Small bowel mucosa with no significant pathologic abnormality  B.   ANTRUM, BIOPSY:  Helicobacter pylori associated chronic gastritis with reactive gastropathy  H. pylori immunohistochemical stain is positive for  organisms  No intestinal metaplasia or dysplasia identified  C.   GE JUNCTION:  Helicobacter pylori associated chronic gastritis with reactive gastropathy  H. pylori immunohistochemical stain is positive for organisms  No intestinal metaplasia or dysplasia identified  No squamous mucosa identified  D.   TERMINAL ILEUM BIOPSY:  Small bowel mucosa with no significant pathologic abnormality  E.   COLON, BIOPSY:  Colonic mucosa with no significant pathologic abnormality  F.   RECTAL BIOPSY:  Rectal type mucosa with mild reactive changes; otherwise, no significant  pathologic abnormality    CLINICAL HISTORY:  Generalized abdominal pain, diarrhea, nausea, hemorrhage of anus and rectum    SPECIMENS RECEIVED:  A.  SMALL BOWEL, BIOPSY  B.  ANTRUM, BIOPSY  C.  GE JUNCTION  D.  TERMINAL ILEUM BIOPSY  E.  COLON, BIOPSY  F.  RECTAL BIOPSY    MICROSCOPIC DESCRIPTION:  Tissue blocks are prepared and slides are examined microscopically on all  specimens. See diagnosis for details.    The internal and  external (both positive and negative) controls reacted  appropriately. Some of our immunohistochemical and in situ hybridization  studies are performed as analyte specific reagents. The following statement  applies to those tests: This test was developed, and its performance  characteristics determined by [facility]. It has not been  cleared or approved by the US Food and Drug Administration. However, the  FDA has determined that approval and  "clearance are not necessary.    Professional interpretation rendered by Susan Del Rio D.O., [facility] at  [facility], [employer], [address] [place] [address].    GROSS DESCRIPTION:  A.  Specimen is received in 1 formalin filled container labeled \"small bowel  biopsy\" consists of 2 pieces of tan soft tissue measuring 0.5 x 0.3 x 0.2 cm  in aggregate.  All submitted in 1 cassette.  BKO  B.  Specimen received in 1 formalin filled container labeled \"antrum biopsy\"  consists of 3 pieces of tan soft tissue measuring 0.6 x 0.3  x 0.2 cm in  aggregate.  All submitted in 1 cassette.  C.  Specimen is received in 1 formalin filled container labeled \"GE junction  biopsy\" consists of 2 pieces of tan soft tissue measuring 0.6 x 0.3 x 0.3 cm  in aggregate.  All submitted in 1 cassette.  D.  Specimen is received in 1 formalin filled container labeled \"terminal ileum  biopsy\" consists of 2 pieces of tan soft tissue measuring 0.7 x 0.3 x 0.2 cm  in aggregate.  All submitted in 1 cassette.  E.  Specimen received in 1 formalin filled container labeled \"colonic mucosa  biopsy\" consists of 3 pieces of tan soft tissue measuring 0.7 x 0.3 x 0.2 cm  in aggregate.  All submitted in 1 cassette.  F.  Specimen received in 1 formalin filled container labeled \"rectum biopsy\"  consists of 2 pieces of tan soft tissue measuring 0.4 x 0.3 x 0.2 cm in  aggregate.  All submitted in 1 cassette.  BKO    REVIEWED, DIAGNOSED AND ELECTRONICALLY  SIGNED BY:    Susan Del Rio D.O., F.C.A.P.  CPT CODES:  88305x6, 88342x2     Results for orders placed or performed during the hospital encounter of 06/07/22   Gold Top - SST   Result Value Ref Range    Extra Tube Hold for add-ons.    Green Top (Gel)   Result Value Ref Range    Extra Tube Hold for add-ons.    Urinalysis, Microscopic Only - Urine, Clean Catch    Specimen: Urine, Clean Catch   Result Value Ref Range    RBC, UA 3-5 (A) None Seen /HPF    WBC, UA 0-2 None Seen, 0-2, 3-5 /HPF    Bacteria, " UA None Seen None Seen /HPF    Squamous Epithelial Cells, UA 0-2 None Seen, 0-2 /HPF    Hyaline Casts, UA None Seen None Seen /LPF    Methodology Automated Microscopy    Urinalysis With Microscopic If Indicated (No Culture) - Urine, Clean Catch    Specimen: Urine, Clean Catch   Result Value Ref Range    Color, UA Yellow Yellow, Straw, Dark Yellow, Nini    Appearance, UA Clear Clear    pH, UA <=5.0 5.0 - 9.0    Specific Gravity, UA 1.020 1.003 - 1.030    Glucose, UA Negative Negative    Ketones, UA Negative Negative    Bilirubin, UA Negative Negative    Blood, UA Moderate (2+) (A) Negative    Protein, UA Negative Negative    Leuk Esterase, UA Negative Negative    Nitrite, UA Negative Negative    Urobilinogen, UA 1.0 E.U./dL 0.2 - 1.0 E.U./dL   CBC Auto Differential    Specimen: Blood   Result Value Ref Range    WBC 9.25 3.40 - 10.80 10*3/mm3    RBC 4.89 4.14 - 5.80 10*6/mm3    Hemoglobin 14.8 13.0 - 17.7 g/dL    Hematocrit 43.0 37.5 - 51.0 %    MCV 87.9 79.0 - 97.0 fL    MCH 30.3 26.6 - 33.0 pg    MCHC 34.4 31.5 - 35.7 g/dL    RDW 12.5 12.3 - 15.4 %    RDW-SD 40.4 37.0 - 54.0 fl    MPV 9.7 6.0 - 12.0 fL    Platelets 282 140 - 450 10*3/mm3    Neutrophil % 57.9 42.7 - 76.0 %    Lymphocyte % 30.1 19.6 - 45.3 %    Monocyte % 8.9 5.0 - 12.0 %    Eosinophil % 2.1 0.3 - 6.2 %    Basophil % 0.5 0.0 - 1.5 %    Immature Grans % 0.5 0.0 - 0.5 %    Neutrophils, Absolute 5.36 1.70 - 7.00 10*3/mm3    Lymphocytes, Absolute 2.78 0.70 - 3.10 10*3/mm3    Monocytes, Absolute 0.82 0.10 - 0.90 10*3/mm3    Eosinophils, Absolute 0.19 0.00 - 0.40 10*3/mm3    Basophils, Absolute 0.05 0.00 - 0.20 10*3/mm3    Immature Grans, Absolute 0.05 0.00 - 0.05 10*3/mm3    nRBC 0.0 0.0 - 0.2 /100 WBC   Lavender Top   Result Value Ref Range    Extra Tube hold for add-on    Light Blue Top   Result Value Ref Range    Extra Tube Hold for add-ons.    Lipase    Specimen: Blood   Result Value Ref Range    Lipase 30 13 - 60 U/L   Comprehensive Metabolic Panel     Specimen: Blood   Result Value Ref Range    Glucose 98 65 - 99 mg/dL    BUN 14 6 - 20 mg/dL    Creatinine 0.83 0.76 - 1.27 mg/dL    Sodium 142 136 - 145 mmol/L    Potassium 3.7 3.5 - 5.2 mmol/L    Chloride 105 98 - 107 mmol/L    CO2 25.0 22.0 - 29.0 mmol/L    Calcium 9.6 8.6 - 10.5 mg/dL    Total Protein 7.7 6.0 - 8.5 g/dL    Albumin 4.60 3.50 - 5.20 g/dL    ALT (SGPT) 27 1 - 41 U/L    AST (SGOT) 22 1 - 40 U/L    Alkaline Phosphatase 88 39 - 117 U/L    Total Bilirubin 0.2 0.0 - 1.2 mg/dL    Globulin 3.1 gm/dL    A/G Ratio 1.5 g/dL    BUN/Creatinine Ratio 16.9 7.0 - 25.0    Anion Gap 12.0 5.0 - 15.0 mmol/L    eGFR 121.5 >60.0 mL/min/1.73         This document has been electronically signed by Amanda Mcclellan MD on August 14, 2022 19:22 CDT

## 2022-08-29 ENCOUNTER — OFFICE VISIT (OUTPATIENT)
Dept: GASTROENTEROLOGY | Facility: CLINIC | Age: 32
End: 2022-08-29

## 2022-08-29 VITALS
HEIGHT: 65 IN | HEART RATE: 97 BPM | WEIGHT: 134 LBS | DIASTOLIC BLOOD PRESSURE: 102 MMHG | BODY MASS INDEX: 22.33 KG/M2 | SYSTOLIC BLOOD PRESSURE: 144 MMHG

## 2022-08-29 DIAGNOSIS — R19.7 DIARRHEA, UNSPECIFIED TYPE: ICD-10-CM

## 2022-08-29 DIAGNOSIS — R10.84 GENERALIZED ABDOMINAL PAIN: Primary | ICD-10-CM

## 2022-08-29 PROCEDURE — 99214 OFFICE O/P EST MOD 30 MIN: CPT | Performed by: INTERNAL MEDICINE

## 2022-08-29 RX ORDER — LANSOPRAZOLE, AMOXICILLIN, CLARITHROMYCIN 30-500-500
KIT ORAL 2 TIMES DAILY
Qty: 14 EACH | Refills: 0 | Status: SHIPPED | OUTPATIENT
Start: 2022-08-29 | End: 2022-09-12

## 2022-08-29 RX ORDER — DICYCLOMINE HCL 20 MG
20 TABLET ORAL EVERY 6 HOURS
Qty: 120 TABLET | Refills: 5 | Status: SHIPPED | OUTPATIENT
Start: 2022-08-29 | End: 2022-09-28

## 2022-09-17 NOTE — PROGRESS NOTES
Chief Complaint   Patient presents with   • Abdominal Pain     1 month f/u   • Diarrhea     1 month f/u       Subjective    Christian Gutierrez is a 32 y.o. male.    History of Present Illness  Patient presented to GI clinic for follow-up visit today.  Has continued symptoms with abdominal pain and diarrhea.  Denies nausea or vomiting.  Weight is stable.  EGD was consistent with hiatal hernia, esophagitis and gastritis.  Path was consistent with H. pylori gastritis.  Colonoscopy was consistent with proctitis and hemorrhoids.       The following portions of the patient's history were reviewed and updated as appropriate:   No past medical history on file.  Past Surgical History:   Procedure Laterality Date   • COLONOSCOPY N/A 7/12/2022    Procedure: COLONOSCOPY;  Surgeon: Amanda Mcclellan MD;  Location: [facility] ENDOSCOPY;  Service: Gastroenterology;  Laterality: N/A;   • ENDOSCOPY N/A 7/12/2022    Procedure: ESOPHAGOGASTRODUODENOSCOPY;  Surgeon: Amanda Mcclellan MD;  Location: [facility] ENDOSCOPY;  Service: Gastroenterology;  Laterality: N/A;     Family History   Problem Relation Age of Onset   • Alcohol abuse Father    • Liver cancer Maternal Aunt        Prior to Admission medications    Medication Sig Start Date End Date Taking? Authorizing Provider   clarithromycin (BIAXIN) 500 MG tablet Take 1 tablet by mouth 2 (Two) Times a Day. 7/25/22  Yes Amanda Mcclellan MD   hydrocortisone (ANUSOL-HC) 25 MG suppository Insert 1 suppository into the rectum 2 (Two) Times a Day. 6/7/22  Yes Jn Madden DO   lansoprazole (PREVACID) 30 MG capsule Take 1 capsule by mouth 2 (Two) Times a Day. 7/25/22  Yes Amanda Mcclellan MD   omeprazole (priLOSEC) 40 MG capsule Take 1 capsule by mouth Daily. 6/20/22  Yes Amanda Mcclellan MD   sucralfate (CARAFATE) 1 g tablet Take 1 g by mouth 4 (Four) Times a Day.   Yes Juwan Connolly MD   dicyclomine (BENTYL) 20 MG tablet Take 1 tablet by mouth Every 6 (Six) Hours  "for 30 days. 8/29/22 9/28/22  Amanda Mcclellan MD     No Known Allergies  Social History     Socioeconomic History   • Marital status: Single   Tobacco Use   • Smoking status: Never Smoker   • Smokeless tobacco: Never Used   Vaping Use   • Vaping Use: Never used   Substance and Sexual Activity   • Alcohol use: Never   • Drug use: Defer   • Sexual activity: Defer       Review of Systems  Review of Systems   Constitutional: Negative for chills, fatigue, fever and unexpected weight change.   HENT: Negative for congestion, ear discharge, hearing loss, nosebleeds and sore throat.    Eyes: Negative for pain, discharge and redness.   Respiratory: Negative for cough, chest tightness, shortness of breath and wheezing.    Cardiovascular: Negative for chest pain and palpitations.   Gastrointestinal: Positive for abdominal pain and diarrhea. Negative for abdominal distention, blood in stool, constipation, nausea and vomiting.   Endocrine: Negative for cold intolerance, polydipsia, polyphagia and polyuria.   Genitourinary: Negative for dysuria, flank pain, frequency, hematuria and urgency.   Musculoskeletal: Negative for arthralgias, back pain, joint swelling and myalgias.   Skin: Negative for color change, pallor and rash.   Neurological: Negative for tremors, seizures, syncope, weakness and headaches.   Hematological: Negative for adenopathy. Does not bruise/bleed easily.   Psychiatric/Behavioral: Negative for behavioral problems, confusion, dysphoric mood, hallucinations and suicidal ideas. The patient is not nervous/anxious.         BP (!) 144/102   Pulse 97   Ht 165.1 cm (65\")   Wt 60.8 kg (134 lb)   BMI 22.30 kg/m²     Objective    Physical Exam  Constitutional:       Appearance: He is well-developed.   HENT:      Head: Normocephalic and atraumatic.   Eyes:      Conjunctiva/sclera: Conjunctivae normal.      Pupils: Pupils are equal, round, and reactive to light.   Neck:      Thyroid: No thyromegaly.   Cardiovascular: "      Rate and Rhythm: Normal rate and regular rhythm.      Heart sounds: Normal heart sounds. No murmur heard.  Pulmonary:      Effort: Pulmonary effort is normal.      Breath sounds: Normal breath sounds. No wheezing.   Abdominal:      General: Bowel sounds are normal. There is no distension.      Palpations: Abdomen is soft. There is no mass.      Tenderness: There is no abdominal tenderness.      Hernia: No hernia is present.   Genitourinary:     Comments: No lesions noted  Musculoskeletal:         General: No tenderness. Normal range of motion.      Cervical back: Normal range of motion and neck supple.   Lymphadenopathy:      Cervical: No cervical adenopathy.   Skin:     General: Skin is warm and dry.      Findings: No rash.   Neurological:      Mental Status: He is alert and oriented to person, place, and time.      Cranial Nerves: No cranial nerve deficit.   Psychiatric:         Thought Content: Thought content normal.       Admission on 2022, Discharged on 2022   Component Date Value Ref Range Status   • Reference Lab Report 2022    Final                    Value:[facility]  [address] [place] [address]  Phone: [phone] or [phone]  Fax: [phone]  Sergio Leyva M.D., Medical Director    PATIENT NAME                           LABORATORY NO.  1800  NIKKI RAMIREZI         [id number]  [id number]                         AGE              SEX  N           CLIENT REF #  [facility]           31      1990                    [id number]    [place]                       REQUESTING M.D.     ATTENDING M.D.     COPY TO[address]  DATE COLLECTED      DATE RECEIVED      DATE REPORTED  2022          2022         07/15/2022    DIAGNOSIS:  A.   SMALL BOWEL, BIOPSY:  Small bowel mucosa with no significant pathologic  "abnormality  B.   ANTRUM, BIOPSY:  Helicobacter pylori associated chronic gastritis with reactive gastropathy  H. pylori immunohistochemical stain is positive for                           organisms  No intestinal metaplasia or dysplasia identified  C.   GE JUNCTION:  Helicobacter pylori associated chronic gastritis with reactive gastropathy  H. pylori immunohistochemical stain is positive for organisms  No intestinal metaplasia or dysplasia identified  No squamous mucosa identified  D.   TERMINAL ILEUM BIOPSY:  Small bowel mucosa with no significant pathologic abnormality  E.   COLON, BIOPSY:  Colonic mucosa with no significant pathologic abnormality  F.   RECTAL BIOPSY:  Rectal type mucosa with mild reactive changes; otherwise, no significant  pathologic abnormality    CLINICAL HISTORY:  Generalized abdominal pain, diarrhea, nausea, hemorrhage of anus and rectum    SPECIMENS RECEIVED:  A.  SMALL BOWEL, BIOPSY  B.  ANTRUM, BIOPSY  C.  GE JUNCTION  D.  TERMINAL ILEUM BIOPSY  E.  COLON, BIOPSY  F.  RECTAL BIOPSY    MICROSCOPIC DESCRIPTION:  Tissue blocks are prepared and slides are examined microscopically on all  specimens. See diagnosis for details.    The internal and                           external (both positive and negative) controls reacted  appropriately. Some of our immunohistochemical and in situ hybridization  studies are performed as analyte specific reagents. The following statement  applies to those tests: This test was developed, and its performance  characteristics determined by [facility]. It has not been  cleared or approved by the US Food and Drug Administration. However, the  FDA has determined that approval and clearance are not necessary.    Professional interpretation rendered by Susan Del Rio D.O., F.C.A.P. at  [facility] [employer], [address] [place], [place] [address].    GROSS DESCRIPTION:  A.  Specimen is received in 1 formalin filled container labeled \"small " "bowel  biopsy\" consists of 2 pieces of tan soft tissue measuring 0.5 x 0.3 x 0.2 cm  in aggregate.  All submitted in 1 cassette.  BKO  B.  Specimen received in 1 formalin filled container labeled \"antrum biopsy\"  consists of 3 pieces of tan soft tissue measuring 0.6 x 0.3                           x 0.2 cm in  aggregate.  All submitted in 1 cassette.  C.  Specimen is received in 1 formalin filled container labeled \"GE junction  biopsy\" consists of 2 pieces of tan soft tissue measuring 0.6 x 0.3 x 0.3 cm  in aggregate.  All submitted in 1 cassette.  D.  Specimen is received in 1 formalin filled container labeled \"terminal ileum  biopsy\" consists of 2 pieces of tan soft tissue measuring 0.7 x 0.3 x 0.2 cm  in aggregate.  All submitted in 1 cassette.  E.  Specimen received in 1 formalin filled container labeled \"colonic mucosa  biopsy\" consists of 3 pieces of tan soft tissue measuring 0.7 x 0.3 x 0.2 cm  in aggregate.  All submitted in 1 cassette.  F.  Specimen received in 1 formalin filled container labeled \"rectum biopsy\"  consists of 2 pieces of tan soft tissue measuring 0.4 x 0.3 x 0.2 cm in  aggregate.  All submitted in 1 cassette.  BKO    REVIEWED, DIAGNOSED AND ELECTRONICALLY  SIGNED BY:    Susan Del Rio D.O., RUSTAM.  CPT CODES:  88305x6, 88342x2       Assessment & Plan    No diagnosis found..   1.  Abdominal pain with nausea and weight loss, likely due to H. pylori gastritis.  Add Prevpac for 2 weeks and continue Prilosec 40 mg p.o. daily and Carafate post completion.    2.  Abdominal pain with diarrhea alternating with constipation, rectal bleeding and weight loss, likely due to proctitis.    Continue Bentyl, Metamucil daily and Preparation H suppositories as needed.   3.  History of alcohol abuse, off currently.  4.  Weight loss,  improving.  Continue p.o. nutritional supplements.    Orders placed during this encounter include:  No orders of the defined types were placed in this encounter.      * " Surgery not found *    Review and/or summary of lab tests, radiology, procedures, medications. Review and summary of old records and obtaining of history. The risks and benefits of my recommendations, as well as other treatment options were discussed with the patient today. Questions were answered.    New Medications Ordered This Visit   Medications   • amoxicillin-clarithromycin-lansoprazole (Prevpac) combo pack     Sig: Take  by mouth 2 (Two) Times a Day for 14 days. Follow package directions.     Dispense:  14 each     Refill:  0   • dicyclomine (BENTYL) 20 MG tablet     Sig: Take 1 tablet by mouth Every 6 (Six) Hours for 30 days.     Dispense:  120 tablet     Refill:  5       Follow-up: Return in about 1 month (around 2022).               Results for orders placed or performed during the hospital encounter of 22   TISSUE EXAM, [facility] ([facility])    Specimen: A: Small Intestine, Duodenum; Tissue    B: Gastric, Antrum; Tissue    C: Esophagus; Tissue    D: Small Intestine, Ileum; Tissue    E: Large Intestine; Tissue    F: Large Intestine, Rectum; Tissue   Result Value Ref Range    Reference Lab Report       [facility]  [address] [place] [address]  Phone: [phone] or [phone]  Fax: [phone]  Sergio Leyva M.D., Medical Director    PATIENT NAME                           LABORATORY NO.  1800  NIKKI RAMIREZ         [id number]  [id number]                         AGE              SEX  N           CLIENT REF #  [facility]           1990  GABI                  [id number]    [place]                       REQUESTING M.D.     ATTENDING M.D.     COPY TO.  [address] [place] [address]  DATE COLLECTED      DATE RECEIVED      DATE REPORTED  2022          2022         07/15/2022    DIAGNOSIS:  A.   SMALL BOWEL, BIOPSY:  Small bowel mucosa  "with no significant pathologic abnormality  B.   ANTRUM, BIOPSY:  Helicobacter pylori associated chronic gastritis with reactive gastropathy  H. pylori immunohistochemical stain is positive for  organisms  No intestinal metaplasia or dysplasia identified  C.   GE JUNCTION:  Helicobacter pylori associated chronic gastritis with reactive gastropathy  H. pylori immunohistochemical stain is positive for organisms  No intestinal metaplasia or dysplasia identified  No squamous mucosa identified  D.   TERMINAL ILEUM BIOPSY:  Small bowel mucosa with no significant pathologic abnormality  E.   COLON, BIOPSY:  Colonic mucosa with no significant pathologic abnormality  F.   RECTAL BIOPSY:  Rectal type mucosa with mild reactive changes; otherwise, no significant  pathologic abnormality    CLINICAL HISTORY:  Generalized abdominal pain, diarrhea, nausea, hemorrhage of anus and rectum    SPECIMENS RECEIVED:  A.  SMALL BOWEL, BIOPSY  B.  ANTRUM, BIOPSY  C.  GE JUNCTION  D.  TERMINAL ILEUM BIOPSY  E.  COLON, BIOPSY  F.  RECTAL BIOPSY    MICROSCOPIC DESCRIPTION:  Tissue blocks are prepared and slides are examined microscopically on all  specimens. See diagnosis for details.    The internal and  external (both positive and negative) controls reacted  appropriately. Some of our immunohistochemical and in situ hybridization  studies are performed as analyte specific reagents. The following statement  applies to those tests: This test was developed, and its performance  characteristics determined by [facility]. It has not been  cleared or approved by the [place] Food and Drug Administration. However, the  FDA has determined that approval and clearance are not necessary.    Professional interpretation rendered by Susan Del Rio D.O., F.C.A.P. at  [facility], [employer], [address] [place] [address].    GROSS DESCRIPTION:  A.  Specimen is received in 1 formalin filled container labeled \"small bowel  biopsy\" consists " "of 2 pieces of tan soft tissue measuring 0.5 x 0.3 x 0.2 cm  in aggregate.  All submitted in 1 cassette.  BKO  B.  Specimen received in 1 formalin filled container labeled \"antrum biopsy\"  consists of 3 pieces of tan soft tissue measuring 0.6 x 0.3  x 0.2 cm in  aggregate.  All submitted in 1 cassette.  C.  Specimen is received in 1 formalin filled container labeled \"GE junction  biopsy\" consists of 2 pieces of tan soft tissue measuring 0.6 x 0.3 x 0.3 cm  in aggregate.  All submitted in 1 cassette.  D.  Specimen is received in 1 formalin filled container labeled \"terminal ileum  biopsy\" consists of 2 pieces of tan soft tissue measuring 0.7 x 0.3 x 0.2 cm  in aggregate.  All submitted in 1 cassette.  E.  Specimen received in 1 formalin filled container labeled \"colonic mucosa  biopsy\" consists of 3 pieces of tan soft tissue measuring 0.7 x 0.3 x 0.2 cm  in aggregate.  All submitted in 1 cassette.  F.  Specimen received in 1 formalin filled container labeled \"rectum biopsy\"  consists of 2 pieces of tan soft tissue measuring 0.4 x 0.3 x 0.2 cm in  aggregate.  All submitted in 1 cassette.  BKO    REVIEWED, DIAGNOSED AND ELECTRONICALLY  SIGNED BY:    Susan Del Rio D.O., F.C.A.P.  CPT CODES:  88305x6, 88342x2     Results for orders placed or performed during the hospital encounter of 06/07/22   Gold Top - SST   Result Value Ref Range    Extra Tube Hold for add-ons.    Green Top (Gel)   Result Value Ref Range    Extra Tube Hold for add-ons.    Urinalysis, Microscopic Only - Urine, Clean Catch    Specimen: Urine, Clean Catch   Result Value Ref Range    RBC, UA 3-5 (A) None Seen /HPF    WBC, UA 0-2 None Seen, 0-2, 3-5 /HPF    Bacteria, UA None Seen None Seen /HPF    Squamous Epithelial Cells, UA 0-2 None Seen, 0-2 /HPF    Hyaline Casts, UA None Seen None Seen /LPF    Methodology Automated Microscopy    Urinalysis With Microscopic If Indicated (No Culture) - Urine, Clean Catch    Specimen: Urine, Clean Catch   Result " Value Ref Range    Color, UA Yellow Yellow, Straw, Dark Yellow, Nini    Appearance, UA Clear Clear    pH, UA <=5.0 5.0 - 9.0    Specific Gravity, UA 1.020 1.003 - 1.030    Glucose, UA Negative Negative    Ketones, UA Negative Negative    Bilirubin, UA Negative Negative    Blood, UA Moderate (2+) (A) Negative    Protein, UA Negative Negative    Leuk Esterase, UA Negative Negative    Nitrite, UA Negative Negative    Urobilinogen, UA 1.0 E.U./dL 0.2 - 1.0 E.U./dL   CBC Auto Differential    Specimen: Blood   Result Value Ref Range    WBC 9.25 3.40 - 10.80 10*3/mm3    RBC 4.89 4.14 - 5.80 10*6/mm3    Hemoglobin 14.8 13.0 - 17.7 g/dL    Hematocrit 43.0 37.5 - 51.0 %    MCV 87.9 79.0 - 97.0 fL    MCH 30.3 26.6 - 33.0 pg    MCHC 34.4 31.5 - 35.7 g/dL    RDW 12.5 12.3 - 15.4 %    RDW-SD 40.4 37.0 - 54.0 fl    MPV 9.7 6.0 - 12.0 fL    Platelets 282 140 - 450 10*3/mm3    Neutrophil % 57.9 42.7 - 76.0 %    Lymphocyte % 30.1 19.6 - 45.3 %    Monocyte % 8.9 5.0 - 12.0 %    Eosinophil % 2.1 0.3 - 6.2 %    Basophil % 0.5 0.0 - 1.5 %    Immature Grans % 0.5 0.0 - 0.5 %    Neutrophils, Absolute 5.36 1.70 - 7.00 10*3/mm3    Lymphocytes, Absolute 2.78 0.70 - 3.10 10*3/mm3    Monocytes, Absolute 0.82 0.10 - 0.90 10*3/mm3    Eosinophils, Absolute 0.19 0.00 - 0.40 10*3/mm3    Basophils, Absolute 0.05 0.00 - 0.20 10*3/mm3    Immature Grans, Absolute 0.05 0.00 - 0.05 10*3/mm3    nRBC 0.0 0.0 - 0.2 /100 WBC   Lavender Top   Result Value Ref Range    Extra Tube hold for add-on    Light Blue Top   Result Value Ref Range    Extra Tube Hold for add-ons.    Lipase    Specimen: Blood   Result Value Ref Range    Lipase 30 13 - 60 U/L   Comprehensive Metabolic Panel    Specimen: Blood   Result Value Ref Range    Glucose 98 65 - 99 mg/dL    BUN 14 6 - 20 mg/dL    Creatinine 0.83 0.76 - 1.27 mg/dL    Sodium 142 136 - 145 mmol/L    Potassium 3.7 3.5 - 5.2 mmol/L    Chloride 105 98 - 107 mmol/L    CO2 25.0 22.0 - 29.0 mmol/L    Calcium 9.6 8.6 - 10.5  mg/dL    Total Protein 7.7 6.0 - 8.5 g/dL    Albumin 4.60 3.50 - 5.20 g/dL    ALT (SGPT) 27 1 - 41 U/L    AST (SGOT) 22 1 - 40 U/L    Alkaline Phosphatase 88 39 - 117 U/L    Total Bilirubin 0.2 0.0 - 1.2 mg/dL    Globulin 3.1 gm/dL    A/G Ratio 1.5 g/dL    BUN/Creatinine Ratio 16.9 7.0 - 25.0    Anion Gap 12.0 5.0 - 15.0 mmol/L    eGFR 121.5 >60.0 mL/min/1.73         This document has been electronically signed by Amanda Mcclellan MD on September 17, 2022 09:08 CDT

## 2022-09-29 ENCOUNTER — OFFICE VISIT (OUTPATIENT)
Dept: GASTROENTEROLOGY | Facility: CLINIC | Age: 32
End: 2022-09-29

## 2022-09-29 VITALS
BODY MASS INDEX: 21.86 KG/M2 | DIASTOLIC BLOOD PRESSURE: 93 MMHG | SYSTOLIC BLOOD PRESSURE: 134 MMHG | WEIGHT: 131.2 LBS | HEIGHT: 65 IN | HEART RATE: 90 BPM

## 2022-09-29 DIAGNOSIS — R10.84 GENERALIZED ABDOMINAL PAIN: ICD-10-CM

## 2022-09-29 DIAGNOSIS — K62.5 HEMORRHAGE OF ANUS AND RECTUM: ICD-10-CM

## 2022-09-29 DIAGNOSIS — B96.81 HELICOBACTER PYLORI GASTRITIS: ICD-10-CM

## 2022-09-29 DIAGNOSIS — K29.70 HELICOBACTER PYLORI GASTRITIS: ICD-10-CM

## 2022-09-29 DIAGNOSIS — R19.7 DIARRHEA, UNSPECIFIED TYPE: Primary | ICD-10-CM

## 2022-09-29 PROCEDURE — 99214 OFFICE O/P EST MOD 30 MIN: CPT | Performed by: PHYSICIAN ASSISTANT

## 2022-09-29 NOTE — PROGRESS NOTES
[facility], operated by [facility]      Chief Complaint:   Chief Complaint   Patient presents with   • Abdominal Pain     1 month follow up. Gas in ABD        Subjective     HPI:   Patient presents for follow-up after completing treatment for H. pylori gastritis.  Patient also noted to have history of diarrhea and underwent colonoscopy for evaluation which found an area of proctitis.  Patient reports today he is doing much better.  Reports his GERD symptoms are well controlled when he remembers to take his omeprazole.  He is not currently taking Carafate.  Patient states he continues to have 2-3 episodes of diarrhea daily.  States this seems to have worsened after taking antibiotics.  Reports Bentyl is helping with pain.    Plan: Patient will continue omeprazole as previously prescribed.  May use Carafate as needed.  Patient will have stool studies completed to include stool for H. pylori and C. difficile.  He will continue to use Bentyl as previously prescribed.  He will follow-up in 2 to 4 weeks for recheck after having stool studies completed.  If diarrhea has not improved we will consider Questran or Xifaxan.    Patient visit conducted via online .    Past Medical History:   History reviewed. No pertinent past medical history.    Past Surgical History:  Past Surgical History:   Procedure Laterality Date   • COLONOSCOPY N/A 7/12/2022    Procedure: COLONOSCOPY;  Surgeon: Amanda Mcclellan MD;  Location: [facility] ENDOSCOPY;  Service: Gastroenterology;  Laterality: N/A;   • ENDOSCOPY N/A 7/12/2022    Procedure: ESOPHAGOGASTRODUODENOSCOPY;  Surgeon: Amanda Mcclellan MD;  Location: [facility] ENDOSCOPY;  Service: Gastroenterology;  Laterality: N/A;       Family History:  Family History   Problem Relation Age of Onset   • Alcohol abuse Father    • Liver cancer Maternal Aunt        Social History:   reports that he has never smoked. He has never used smokeless tobacco. Drug use questions deferred to the  "physician. He reports that he does not drink alcohol.    Medications:   Prior to Admission medications    Medication Sig Start Date End Date Taking? Authorizing Provider   dicyclomine (BENTYL) 20 MG tablet Take 1 tablet by mouth Every 6 (Six) Hours for 30 days. 8/29/22 9/28/22  Amanda Mcclellan MD   hydrocortisone (ANUSOL-HC) 25 MG suppository Insert 1 suppository into the rectum 2 (Two) Times a Day. 6/7/22   Jn Madden DO   omeprazole (priLOSEC) 40 MG capsule Take 1 capsule by mouth Daily. 6/20/22   Amanda Mcclellan MD   sucralfate (CARAFATE) 1 g tablet Take 1 g by mouth 4 (Four) Times a Day.    Provider, MD Juwan   clarithromycin (BIAXIN) 500 MG tablet Take 1 tablet by mouth 2 (Two) Times a Day. 7/25/22 9/29/22  Amanda Mcclellan MD   lansoprazole (PREVACID) 30 MG capsule Take 1 capsule by mouth 2 (Two) Times a Day. 7/25/22 9/29/22  Amanda Mcclellan MD       Allergies:  Patient has no known allergies.    ROS:    Review of Systems   Constitutional: Negative.  Negative for fever.   HENT: Negative.    Eyes: Negative.    Respiratory: Negative.  Negative for shortness of breath.    Cardiovascular: Negative.  Negative for chest pain.   Gastrointestinal: Positive for diarrhea (watery). Negative for abdominal distention, abdominal pain, anal bleeding, blood in stool, constipation, nausea, rectal pain and vomiting.   Endocrine: Negative.    Genitourinary: Negative.    Skin: Negative.    Neurological: Negative.    Hematological: Negative.    Psychiatric/Behavioral: Negative.      Objective     Blood pressure 134/93, pulse 90, height 165.1 cm (65\"), weight 59.5 kg (131 lb 3.2 oz).    Physical Exam  Vitals and nursing note reviewed.   Constitutional:       Appearance: Normal appearance.   HENT:      Head: Normocephalic and atraumatic.      Mouth/Throat:      Mouth: Mucous membranes are moist.      Pharynx: Oropharynx is clear.   Eyes:      General: No scleral icterus.     Conjunctiva/sclera: Conjunctivae " normal.   Cardiovascular:      Rate and Rhythm: Normal rate and regular rhythm.   Pulmonary:      Effort: Pulmonary effort is normal.      Breath sounds: Normal breath sounds.   Abdominal:      General: Bowel sounds are normal.      Palpations: Abdomen is soft.      Tenderness: There is no abdominal tenderness. There is no guarding or rebound.   Musculoskeletal:         General: No swelling.   Skin:     General: Skin is warm.      Coloration: Skin is not jaundiced.   Neurological:      General: No focal deficit present.      Mental Status: He is alert.   Psychiatric:         Behavior: Behavior normal.          Assessment & Plan   Diagnoses and all orders for this visit:    1. Diarrhea, unspecified type (Primary)  -     H. Pylori Antigen, Stool - Stool, Per Rectum; Future  -     Clostridioides difficile EIA - Stool, Per Rectum; Future  -     Gastrointestinal Panel, PCR - Stool, Per Rectum; Future    2. Helicobacter pylori gastritis  -     H. Pylori Antigen, Stool - Stool, Per Rectum; Future    3. Hemorrhage of anus and rectum  -     H. Pylori Antigen, Stool - Stool, Per Rectum; Future  -     Clostridioides difficile EIA - Stool, Per Rectum; Future  -     Gastrointestinal Panel, PCR - Stool, Per Rectum; Future    4. Generalized abdominal pain  -     H. Pylori Antigen, Stool - Stool, Per Rectum; Future  -     Clostridioides difficile EIA - Stool, Per Rectum; Future  -     Gastrointestinal Panel, PCR - Stool, Per Rectum; Future        * Surgery not found *     Diagnosis Plan   1. Diarrhea, unspecified type  H. Pylori Antigen, Stool - Stool, Per Rectum    Clostridioides difficile EIA - Stool, Per Rectum    Gastrointestinal Panel, PCR - Stool, Per Rectum   2. Helicobacter pylori gastritis  H. Pylori Antigen, Stool - Stool, Per Rectum   3. Hemorrhage of anus and rectum  H. Pylori Antigen, Stool - Stool, Per Rectum    Clostridioides difficile EIA - Stool, Per Rectum    Gastrointestinal Panel, PCR - Stool, Per Rectum   4.  Generalized abdominal pain  H. Pylori Antigen, Stool - Stool, Per Rectum    Clostridioides difficile EIA - Stool, Per Rectum    Gastrointestinal Panel, PCR - Stool, Per Rectum       Anticipated Surgical Procedure:  Orders Placed This Encounter   Procedures   • Clostridioides difficile EIA - Stool, Per Rectum     Standing Status:   Future     Standing Expiration Date:   9/29/2023     Order Specific Question:   Release to patient     Answer:   Routine Release   • Gastrointestinal Panel, PCR - Stool, Per Rectum     Standing Status:   Future     Standing Expiration Date:   9/29/2023     Order Specific Question:   Release to patient     Answer:   Routine Release   • H. Pylori Antigen, Stool - Stool, Per Rectum     Standing Status:   Future     Standing Expiration Date:   9/29/2023     Order Specific Question:   Release to patient     Answer:   Routine Release       The risks, benefits, and alternatives of this procedure have been discussed with the patient or the responsible party- the patient understands and agrees to proceed.

## 2022-10-28 ENCOUNTER — OFFICE VISIT (OUTPATIENT)
Dept: GASTROENTEROLOGY | Facility: CLINIC | Age: 32
End: 2022-10-28

## 2022-10-28 VITALS
WEIGHT: 133 LBS | HEART RATE: 77 BPM | SYSTOLIC BLOOD PRESSURE: 119 MMHG | DIASTOLIC BLOOD PRESSURE: 85 MMHG | BODY MASS INDEX: 22.16 KG/M2 | HEIGHT: 65 IN

## 2022-10-28 DIAGNOSIS — R10.84 GENERALIZED ABDOMINAL PAIN: Primary | ICD-10-CM

## 2022-10-28 PROCEDURE — 99213 OFFICE O/P EST LOW 20 MIN: CPT | Performed by: INTERNAL MEDICINE

## 2022-10-28 RX ORDER — DICYCLOMINE HCL 20 MG
20 TABLET ORAL 2 TIMES DAILY
Qty: 60 TABLET | Refills: 5 | Status: SHIPPED | OUTPATIENT
Start: 2022-10-28 | End: 2022-11-27

## 2022-11-14 NOTE — PROGRESS NOTES
Chief Complaint   Patient presents with   • 1 month follow up \   • Abdominal Pain       Subjective    Herzon Noah Gutierrez is a 32 y.o. male.    History of Present Illness  Patient presented for follow-up visit today.  Has occasional symptoms of abdominal pain.  Diarrhea has improved.  Has occasional symptoms with constipation.  Denies nausea or vomiting.  Weight is stable.     The following portions of the patient's history were reviewed and updated as appropriate:   History reviewed. No pertinent past medical history.  Past Surgical History:   Procedure Laterality Date   • COLONOSCOPY N/A 7/12/2022    Procedure: COLONOSCOPY;  Surgeon: Amanda Mcclellan MD;  Location: [facility] ENDOSCOPY;  Service: Gastroenterology;  Laterality: N/A;   • ENDOSCOPY N/A 7/12/2022    Procedure: ESOPHAGOGASTRODUODENOSCOPY;  Surgeon: Amanda Mcclellan MD;  Location: [facility] ENDOSCOPY;  Service: Gastroenterology;  Laterality: N/A;     Family History   Problem Relation Age of Onset   • Alcohol abuse Father    • Liver cancer Maternal Aunt        Prior to Admission medications    Medication Sig Start Date End Date Taking? Authorizing Provider   hydrocortisone (ANUSOL-HC) 25 MG suppository Insert 1 suppository into the rectum 2 (Two) Times a Day. 6/7/22  Yes Jn Madden DO   omeprazole (priLOSEC) 40 MG capsule Take 1 capsule by mouth Daily. 6/20/22  Yes Amanda Mcclellan MD   sucralfate (CARAFATE) 1 g tablet Take 1 g by mouth 4 (Four) Times a Day.   Yes ProviderJuwan MD   dicyclomine (BENTYL) 20 MG tablet Take 1 tablet by mouth 2 (Two) Times a Day for 30 days. 10/28/22 11/27/22  Amanda Mcclellan MD     No Known Allergies  Social History     Socioeconomic History   • Marital status: Single   Tobacco Use   • Smoking status: Never   • Smokeless tobacco: Never   Vaping Use   • Vaping Use: Never used   Substance and Sexual Activity   • Alcohol use: Never   • Drug use: Defer   • Sexual activity: Defer       Review of  "Systems  Review of Systems   Constitutional: Negative for chills, fatigue, fever and unexpected weight change.   HENT: Negative for congestion, ear discharge, hearing loss, nosebleeds and sore throat.    Eyes: Negative for pain, discharge and redness.   Respiratory: Negative for cough, chest tightness, shortness of breath and wheezing.    Cardiovascular: Negative for chest pain and palpitations.   Gastrointestinal: Positive for abdominal pain and constipation. Negative for abdominal distention, blood in stool, diarrhea, nausea and vomiting.   Endocrine: Negative for cold intolerance, polydipsia, polyphagia and polyuria.   Genitourinary: Negative for dysuria, flank pain, frequency, hematuria and urgency.   Musculoskeletal: Negative for arthralgias, back pain, joint swelling and myalgias.   Skin: Negative for color change, pallor and rash.   Neurological: Negative for tremors, seizures, syncope, weakness and headaches.   Hematological: Negative for adenopathy. Does not bruise/bleed easily.   Psychiatric/Behavioral: Negative for behavioral problems, confusion, dysphoric mood, hallucinations and suicidal ideas. The patient is not nervous/anxious.         /85 (BP Location: Right arm)   Pulse 77   Ht 165.1 cm (65\")   Wt 60.3 kg (133 lb)   BMI 22.13 kg/m²     Objective    Physical Exam  Constitutional:       Appearance: He is well-developed.   HENT:      Head: Normocephalic and atraumatic.   Eyes:      Conjunctiva/sclera: Conjunctivae normal.      Pupils: Pupils are equal, round, and reactive to light.   Neck:      Thyroid: No thyromegaly.   Cardiovascular:      Rate and Rhythm: Normal rate and regular rhythm.      Heart sounds: Normal heart sounds. No murmur heard.  Pulmonary:      Effort: Pulmonary effort is normal.      Breath sounds: Normal breath sounds. No wheezing.   Abdominal:      General: Bowel sounds are normal. There is no distension.      Palpations: Abdomen is soft. There is no mass.      " Tenderness: There is no abdominal tenderness.      Hernia: No hernia is present.   Genitourinary:     Comments: No lesions noted  Musculoskeletal:         General: No tenderness. Normal range of motion.      Cervical back: Normal range of motion and neck supple.   Lymphadenopathy:      Cervical: No cervical adenopathy.   Skin:     General: Skin is warm and dry.      Findings: No rash.   Neurological:      Mental Status: He is alert and oriented to person, place, and time.      Cranial Nerves: No cranial nerve deficit.   Psychiatric:         Thought Content: Thought content normal.       Admission on 2022, Discharged on 2022   Component Date Value Ref Range Status   • Reference Lab Report 2022    Final                    Value:[facility]  [address] [place] [address]  Phone: [phone] or [phone]  Fax: [phone]  Sergio Leyva M.D., Medical Director    PATIENT NAME                           LABORATORY NO.  1800  NIKKI RAMIREZ         [id number]  [id number]                         AGE              SEX  SSN           CLIENT REF #  [facility]           31      1990                    [id number]    [place]                       REQUESTING M.D.     ATTENDING M.D.     COPY TO.  [address] [place] [address]  DATE COLLECTED      DATE RECEIVED      DATE REPORTED  2022          2022         07/15/2022    DIAGNOSIS:  A.   SMALL BOWEL, BIOPSY:  Small bowel mucosa with no significant pathologic abnormality  B.   ANTRUM, BIOPSY:  Helicobacter pylori associated chronic gastritis with reactive gastropathy  H. pylori immunohistochemical stain is positive for                           organisms  No intestinal metaplasia or dysplasia identified  C.   GE JUNCTION:  Helicobacter pylori associated chronic gastritis with reactive gastropathy  H. pylori  "immunohistochemical stain is positive for organisms  No intestinal metaplasia or dysplasia identified  No squamous mucosa identified  D.   TERMINAL ILEUM BIOPSY:  Small bowel mucosa with no significant pathologic abnormality  E.   COLON, BIOPSY:  Colonic mucosa with no significant pathologic abnormality  F.   RECTAL BIOPSY:  Rectal type mucosa with mild reactive changes; otherwise, no significant  pathologic abnormality    CLINICAL HISTORY:  Generalized abdominal pain, diarrhea, nausea, hemorrhage of anus and rectum    SPECIMENS RECEIVED:  A.  SMALL BOWEL, BIOPSY  B.  ANTRUM, BIOPSY  C.  GE JUNCTION  D.  TERMINAL ILEUM BIOPSY  E.  COLON, BIOPSY  F.  RECTAL BIOPSY    MICROSCOPIC DESCRIPTION:  Tissue blocks are prepared and slides are examined microscopically on all  specimens. See diagnosis for details.    The internal and                           external (both positive and negative) controls reacted  appropriately. Some of our immunohistochemical and in situ hybridization  studies are performed as analyte specific reagents. The following statement  applies to those tests: This test was developed, and its performance  characteristics determined by [facility]. It has not been  cleared or approved by the US Food and Drug Administration. However, the  FDA has determined that approval and clearance are not necessary.    Professional interpretation rendered by Susan Del Rio D.O., F.C.A.P. at  [facility], [facility], [address] [place] [address].    GROSS DESCRIPTION:  A.  Specimen is received in 1 formalin filled container labeled \"small bowel  biopsy\" consists of 2 pieces of tan soft tissue measuring 0.5 x 0.3 x 0.2 cm  in aggregate.  All submitted in 1 cassette.  BKO  B.  Specimen received in 1 formalin filled container labeled \"antrum biopsy\"  consists of 3 pieces of tan soft tissue measuring 0.6 x 0.3                           x 0.2 cm in  aggregate.  All submitted in 1 cassette.  C.  " "Specimen is received in 1 formalin filled container labeled \"GE junction  biopsy\" consists of 2 pieces of tan soft tissue measuring 0.6 x 0.3 x 0.3 cm  in aggregate.  All submitted in 1 cassette.  D.  Specimen is received in 1 formalin filled container labeled \"terminal ileum  biopsy\" consists of 2 pieces of tan soft tissue measuring 0.7 x 0.3 x 0.2 cm  in aggregate.  All submitted in 1 cassette.  E.  Specimen received in 1 formalin filled container labeled \"colonic mucosa  biopsy\" consists of 3 pieces of tan soft tissue measuring 0.7 x 0.3 x 0.2 cm  in aggregate.  All submitted in 1 cassette.  F.  Specimen received in 1 formalin filled container labeled \"rectum biopsy\"  consists of 2 pieces of tan soft tissue measuring 0.4 x 0.3 x 0.2 cm in  aggregate.  All submitted in 1 cassette.  BKO    REVIEWED, DIAGNOSED AND ELECTRONICALLY  SIGNED BY:    Susan Del Rio D.O., RUSTAM.  CPT CODES:  88305x6, 88342x2       Assessment & Plan    No diagnosis found..   1.  Abdominal pain with nausea and weight loss, improved.  Patient completed Prevpac.  Continue Prilosec and Carafate.  2.  Abdominal pain with diarrhea bloating and constipation, improved.  Decrease Bentyl to twice daily.  Continue Metamucil.  3.  Weight loss, improving.  Continue p.o. nutritional supplements.  4.  History of alcohol usage, off currently.    Orders placed during this encounter include:  No orders of the defined types were placed in this encounter.      * Surgery not found *    Review and/or summary of lab tests, radiology, procedures, medications. Review and summary of old records and obtaining of history. The risks and benefits of my recommendations, as well as other treatment options were discussed with the patient today. Questions were answered.    New Medications Ordered This Visit   Medications   • dicyclomine (BENTYL) 20 MG tablet     Sig: Take 1 tablet by mouth 2 (Two) Times a Day for 30 days.     Dispense:  60 tablet     Refill:  5 "       Follow-up: Return in about 3 months (around 2023).               Results for orders placed or performed during the hospital encounter of 22   TISSUE EXAM, [facility] ([facility])    Specimen: A: Small Intestine, Duodenum; Tissue    B: Gastric, Antrum; Tissue    C: Esophagus; Tissue    D: Small Intestine, Ileum; Tissue    E: Large Intestine; Tissue    F: Large Intestine, Rectum; Tissue   Result Value Ref Range    Reference Lab Report       [facility]  [address] [place] [address]  Phone: [phone] or [phone]  Fax: [phone]  Sergio Leyva M.D., Medical Director    PATIENT NAME                           LABORATORY NO.  1800  NIKKI RAMIREZ         [id number]  [id number]                         AGE              SEX  SSN           CLIENT REF #  [facility]           31      1990                    [id number]    [place]                       REQUESTING M.D.     ATTENDING M.D.     COPY TO[address]  DATE COLLECTED      DATE RECEIVED      DATE REPORTED  2022          2022         07/15/2022    DIAGNOSIS:  A.   SMALL BOWEL, BIOPSY:  Small bowel mucosa with no significant pathologic abnormality  B.   ANTRUM, BIOPSY:  Helicobacter pylori associated chronic gastritis with reactive gastropathy  H. pylori immunohistochemical stain is positive for  organisms  No intestinal metaplasia or dysplasia identified  C.   GE JUNCTION:  Helicobacter pylori associated chronic gastritis with reactive gastropathy  H. pylori immunohistochemical stain is positive for organisms  No intestinal metaplasia or dysplasia identified  No squamous mucosa identified  D.   TERMINAL ILEUM BIOPSY:  Small bowel mucosa with no significant pathologic abnormality  E.   COLON, BIOPSY:  Colonic mucosa with no significant pathologic abnormality  F.   RECTAL  "BIOPSY:  Rectal type mucosa with mild reactive changes; otherwise, no significant  pathologic abnormality    CLINICAL HISTORY:  Generalized abdominal pain, diarrhea, nausea, hemorrhage of anus and rectum    SPECIMENS RECEIVED:  A.  SMALL BOWEL, BIOPSY  B.  ANTRUM, BIOPSY  C.  GE JUNCTION  D.  TERMINAL ILEUM BIOPSY  E.  COLON, BIOPSY  F.  RECTAL BIOPSY    MICROSCOPIC DESCRIPTION:  Tissue blocks are prepared and slides are examined microscopically on all  specimens. See diagnosis for details.    The internal and  external (both positive and negative) controls reacted  appropriately. Some of our immunohistochemical and in situ hybridization  studies are performed as analyte specific reagents. The following statement  applies to those tests: This test was developed, and its performance  characteristics determined by [facility]. It has not been  cleared or approved by the [place] Food and Drug Administration. However, the  FDA has determined that approval and clearance are not necessary.    Professional interpretation rendered by Susan Del Rio D.O., F.C.A.P. at  [facility], [facility], [address] [place] [address].    GROSS DESCRIPTION:  A.  Specimen is received in 1 formalin filled container labeled \"small bowel  biopsy\" consists of 2 pieces of tan soft tissue measuring 0.5 x 0.3 x 0.2 cm  in aggregate.  All submitted in 1 cassette.  BKO  B.  Specimen received in 1 formalin filled container labeled \"antrum biopsy\"  consists of 3 pieces of tan soft tissue measuring 0.6 x 0.3  x 0.2 cm in  aggregate.  All submitted in 1 cassette.  C.  Specimen is received in 1 formalin filled container labeled \"GE junction  biopsy\" consists of 2 pieces of tan soft tissue measuring 0.6 x 0.3 x 0.3 cm  in aggregate.  All submitted in 1 cassette.  D.  Specimen is received in 1 formalin filled container labeled \"terminal ileum  biopsy\" consists of 2 pieces of tan soft tissue measuring 0.7 x 0.3 x 0.2 cm  in aggregate.  " "All submitted in 1 cassette.  E.  Specimen received in 1 formalin filled container labeled \"colonic mucosa  biopsy\" consists of 3 pieces of tan soft tissue measuring 0.7 x 0.3 x 0.2 cm  in aggregate.  All submitted in 1 cassette.  F.  Specimen received in 1 formalin filled container labeled \"rectum biopsy\"  consists of 2 pieces of tan soft tissue measuring 0.4 x 0.3 x 0.2 cm in  aggregate.  All submitted in 1 cassette.  BKO    REVIEWED, DIAGNOSED AND ELECTRONICALLY  SIGNED BY:    Susan Del Rio D.O., RUSTAM.  CPT CODES:  88305x6, 88342x2     Results for orders placed or performed during the hospital encounter of 06/07/22   Gold Top - SST   Result Value Ref Range    Extra Tube Hold for add-ons.    Green Top (Gel)   Result Value Ref Range    Extra Tube Hold for add-ons.    Urinalysis, Microscopic Only - Urine, Clean Catch    Specimen: Urine, Clean Catch   Result Value Ref Range    RBC, UA 3-5 (A) None Seen /HPF    WBC, UA 0-2 None Seen, 0-2, 3-5 /HPF    Bacteria, UA None Seen None Seen /HPF    Squamous Epithelial Cells, UA 0-2 None Seen, 0-2 /HPF    Hyaline Casts, UA None Seen None Seen /LPF    Methodology Automated Microscopy    Urinalysis With Microscopic If Indicated (No Culture) - Urine, Clean Catch    Specimen: Urine, Clean Catch   Result Value Ref Range    Color, UA Yellow Yellow, Straw, Dark Yellow, Nini    Appearance, UA Clear Clear    pH, UA <=5.0 5.0 - 9.0    Specific Gravity, UA 1.020 1.003 - 1.030    Glucose, UA Negative Negative    Ketones, UA Negative Negative    Bilirubin, UA Negative Negative    Blood, UA Moderate (2+) (A) Negative    Protein, UA Negative Negative    Leuk Esterase, UA Negative Negative    Nitrite, UA Negative Negative    Urobilinogen, UA 1.0 E.U./dL 0.2 - 1.0 E.U./dL   CBC Auto Differential    Specimen: Blood   Result Value Ref Range    WBC 9.25 3.40 - 10.80 10*3/mm3    RBC 4.89 4.14 - 5.80 10*6/mm3    Hemoglobin 14.8 13.0 - 17.7 g/dL    Hematocrit 43.0 37.5 - 51.0 %    MCV 87.9 " 79.0 - 97.0 fL    MCH 30.3 26.6 - 33.0 pg    MCHC 34.4 31.5 - 35.7 g/dL    RDW 12.5 12.3 - 15.4 %    RDW-SD 40.4 37.0 - 54.0 fl    MPV 9.7 6.0 - 12.0 fL    Platelets 282 140 - 450 10*3/mm3    Neutrophil % 57.9 42.7 - 76.0 %    Lymphocyte % 30.1 19.6 - 45.3 %    Monocyte % 8.9 5.0 - 12.0 %    Eosinophil % 2.1 0.3 - 6.2 %    Basophil % 0.5 0.0 - 1.5 %    Immature Grans % 0.5 0.0 - 0.5 %    Neutrophils, Absolute 5.36 1.70 - 7.00 10*3/mm3    Lymphocytes, Absolute 2.78 0.70 - 3.10 10*3/mm3    Monocytes, Absolute 0.82 0.10 - 0.90 10*3/mm3    Eosinophils, Absolute 0.19 0.00 - 0.40 10*3/mm3    Basophils, Absolute 0.05 0.00 - 0.20 10*3/mm3    Immature Grans, Absolute 0.05 0.00 - 0.05 10*3/mm3    nRBC 0.0 0.0 - 0.2 /100 WBC   Lavender Top   Result Value Ref Range    Extra Tube hold for add-on    Light Blue Top   Result Value Ref Range    Extra Tube Hold for add-ons.    Lipase    Specimen: Blood   Result Value Ref Range    Lipase 30 13 - 60 U/L   Comprehensive Metabolic Panel    Specimen: Blood   Result Value Ref Range    Glucose 98 65 - 99 mg/dL    BUN 14 6 - 20 mg/dL    Creatinine 0.83 0.76 - 1.27 mg/dL    Sodium 142 136 - 145 mmol/L    Potassium 3.7 3.5 - 5.2 mmol/L    Chloride 105 98 - 107 mmol/L    CO2 25.0 22.0 - 29.0 mmol/L    Calcium 9.6 8.6 - 10.5 mg/dL    Total Protein 7.7 6.0 - 8.5 g/dL    Albumin 4.60 3.50 - 5.20 g/dL    ALT (SGPT) 27 1 - 41 U/L    AST (SGOT) 22 1 - 40 U/L    Alkaline Phosphatase 88 39 - 117 U/L    Total Bilirubin 0.2 0.0 - 1.2 mg/dL    Globulin 3.1 gm/dL    A/G Ratio 1.5 g/dL    BUN/Creatinine Ratio 16.9 7.0 - 25.0    Anion Gap 12.0 5.0 - 15.0 mmol/L    eGFR 121.5 >60.0 mL/min/1.73         This document has been electronically signed by Amanda Mcclellan MD on November 13, 2022 20:37 CST

## 2023-01-30 ENCOUNTER — OFFICE VISIT (OUTPATIENT)
Dept: GASTROENTEROLOGY | Facility: CLINIC | Age: 33
End: 2023-01-30

## 2023-01-30 VITALS
BODY MASS INDEX: 22.06 KG/M2 | WEIGHT: 132.4 LBS | HEIGHT: 65 IN | HEART RATE: 90 BPM | DIASTOLIC BLOOD PRESSURE: 82 MMHG | SYSTOLIC BLOOD PRESSURE: 122 MMHG

## 2023-01-30 DIAGNOSIS — K59.09 OTHER CONSTIPATION: ICD-10-CM

## 2023-01-30 DIAGNOSIS — R10.84 GENERALIZED ABDOMINAL PAIN: Primary | ICD-10-CM

## 2023-01-30 PROCEDURE — 99213 OFFICE O/P EST LOW 20 MIN: CPT | Performed by: INTERNAL MEDICINE

## 2023-01-30 RX ORDER — POLYETHYLENE GLYCOL 3350 17 G/17G
17 POWDER, FOR SOLUTION ORAL DAILY
Qty: 30 PACKET | Refills: 6 | Status: SHIPPED | OUTPATIENT
Start: 2023-01-30 | End: 2023-03-01

## 2023-02-07 ENCOUNTER — HOSPITAL ENCOUNTER (OUTPATIENT)
Dept: NUCLEAR MEDICINE | Facility: HOSPITAL | Age: 33
Discharge: HOME | End: 2023-02-07

## 2023-02-07 DIAGNOSIS — K59.09 OTHER CONSTIPATION: ICD-10-CM

## 2023-02-07 DIAGNOSIS — R10.84 GENERALIZED ABDOMINAL PAIN: ICD-10-CM

## 2023-02-07 PROCEDURE — A9537 TC99M MEBROFENIN: HCPCS | Performed by: INTERNAL MEDICINE

## 2023-02-07 PROCEDURE — 0 TECHNETIUM TC 99M MEBROFENIN KIT: Performed by: INTERNAL MEDICINE

## 2023-02-07 PROCEDURE — 78226 HEPATOBILIARY SYSTEM IMAGING: CPT

## 2023-02-07 RX ORDER — KIT FOR THE PREPARATION OF TECHNETIUM TC 99M MEBROFENIN 45 MG/10ML
1 INJECTION, POWDER, LYOPHILIZED, FOR SOLUTION INTRAVENOUS
Status: COMPLETED | OUTPATIENT
Start: 2023-02-07 | End: 2023-02-07

## 2023-02-07 RX ADMIN — MEBROFENIN 1 DOSE: 45 INJECTION, POWDER, LYOPHILIZED, FOR SOLUTION INTRAVENOUS at 09:41

## 2023-02-14 NOTE — PROGRESS NOTES
Chief Complaint   Patient presents with   • Abdominal Pain     3 month f/u          Subjective    Herzon Noah Gutierrez is a 32 y.o. male.    History of Present Illness  Patient presented to the GI clinic for follow-up visit today.  Feels some better currently.  Has intermittent abdominal pain with constipation.  Denies nausea or vomiting.  Weight is stable.  No further rectal bleeding or pain.       The following portions of the patient's history were reviewed and updated as appropriate:   History reviewed. No pertinent past medical history.  Past Surgical History:   Procedure Laterality Date   • COLONOSCOPY N/A 7/12/2022    Procedure: COLONOSCOPY;  Surgeon: Amanda Mcclellan MD;  Location: [facility] ENDOSCOPY;  Service: Gastroenterology;  Laterality: N/A;   • ENDOSCOPY N/A 7/12/2022    Procedure: ESOPHAGOGASTRODUODENOSCOPY;  Surgeon: Amanda Mcclellan MD;  Location: [facility] ENDOSCOPY;  Service: Gastroenterology;  Laterality: N/A;     Family History   Problem Relation Age of Onset   • Alcohol abuse Father    • Liver cancer Maternal Aunt        Prior to Admission medications    Medication Sig Start Date End Date Taking? Authorizing Provider   hydrocortisone (ANUSOL-HC) 25 MG suppository Insert 1 suppository into the rectum 2 (Two) Times a Day. 6/7/22   Jn Madden,    omeprazole (priLOSEC) 40 MG capsule Take 1 capsule by mouth Daily. 6/20/22   Amanda Mcclellan MD   polyethylene glycol (MIRALAX) 17 g packet Take 17 g by mouth Daily for 30 days. 1/30/23 3/1/23  Amanda Mcclellan MD   sucralfate (CARAFATE) 1 g tablet Take 1 g by mouth 4 (Four) Times a Day.    Provider, MD Juwan     No Known Allergies  Social History     Socioeconomic History   • Marital status: Single   Tobacco Use   • Smoking status: Never   • Smokeless tobacco: Never   Vaping Use   • Vaping Use: Never used   Substance and Sexual Activity   • Alcohol use: Never   • Drug use: Defer   • Sexual activity: Defer       Review of  "Systems  Review of Systems   Constitutional: Negative for chills, fatigue, fever and unexpected weight change.   HENT: Negative for congestion, ear discharge, hearing loss, nosebleeds and sore throat.    Eyes: Negative for pain, discharge and redness.   Respiratory: Negative for cough, chest tightness, shortness of breath and wheezing.    Cardiovascular: Negative for chest pain and palpitations.   Gastrointestinal: Positive for abdominal pain and constipation. Negative for abdominal distention, blood in stool, diarrhea, nausea and vomiting.   Endocrine: Negative for cold intolerance, polydipsia, polyphagia and polyuria.   Genitourinary: Negative for dysuria, flank pain, frequency, hematuria and urgency.   Musculoskeletal: Negative for arthralgias, back pain, joint swelling and myalgias.   Skin: Negative for color change, pallor and rash.   Neurological: Negative for tremors, seizures, syncope, weakness and headaches.   Hematological: Negative for adenopathy. Does not bruise/bleed easily.   Psychiatric/Behavioral: Negative for behavioral problems, confusion, dysphoric mood, hallucinations and suicidal ideas. The patient is not nervous/anxious.         /82 (BP Location: Right arm)   Pulse 90   Ht 165.1 cm (65\")   Wt 60.1 kg (132 lb 6.4 oz)   BMI 22.03 kg/m²     Objective    Physical Exam  Constitutional:       Appearance: He is well-developed.   HENT:      Head: Normocephalic and atraumatic.   Eyes:      Conjunctiva/sclera: Conjunctivae normal.      Pupils: Pupils are equal, round, and reactive to light.   Neck:      Thyroid: No thyromegaly.   Cardiovascular:      Rate and Rhythm: Normal rate and regular rhythm.      Heart sounds: Normal heart sounds. No murmur heard.  Pulmonary:      Effort: Pulmonary effort is normal.      Breath sounds: Normal breath sounds. No wheezing.   Abdominal:      General: Bowel sounds are normal. There is no distension.      Palpations: Abdomen is soft. There is no mass.      " Tenderness: There is no abdominal tenderness.      Hernia: No hernia is present.   Genitourinary:     Comments: No lesions noted  Musculoskeletal:         General: No tenderness. Normal range of motion.      Cervical back: Normal range of motion and neck supple.   Lymphadenopathy:      Cervical: No cervical adenopathy.   Skin:     General: Skin is warm and dry.      Findings: No rash.   Neurological:      Mental Status: He is alert and oriented to person, place, and time.      Cranial Nerves: No cranial nerve deficit.   Psychiatric:         Thought Content: Thought content normal.       Admission on 2022, Discharged on 2022   Component Date Value Ref Range Status   • Reference Lab Report 2022    Final                    Value:[facility]  [address] [place] [address]  Phone: [phone] or [phone]  Fax: [phone]  Sergio Leyva M.D., Medical Director    PATIENT NAME                           LABORATORY NO.  1800  NIKKI RAMIREZ         [id number]  [id number]                         AGE              SEX  SSN           CLIENT REF #  [facility]           31      1990                    [id number]    [place]                       REQUESTING M.D.     ATTENDING M.D.     COPY TO.  [address] [place] [address]  DATE COLLECTED      DATE RECEIVED      DATE REPORTED  2022          2022         07/15/2022    DIAGNOSIS:  A.   SMALL BOWEL, BIOPSY:  Small bowel mucosa with no significant pathologic abnormality  B.   ANTRUM, BIOPSY:  Helicobacter pylori associated chronic gastritis with reactive gastropathy  H. pylori immunohistochemical stain is positive for                           organisms  No intestinal metaplasia or dysplasia identified  C.   GE JUNCTION:  Helicobacter pylori associated chronic gastritis with reactive gastropathy  H. pylori  "immunohistochemical stain is positive for organisms  No intestinal metaplasia or dysplasia identified  No squamous mucosa identified  D.   TERMINAL ILEUM BIOPSY:  Small bowel mucosa with no significant pathologic abnormality  E.   COLON, BIOPSY:  Colonic mucosa with no significant pathologic abnormality  F.   RECTAL BIOPSY:  Rectal type mucosa with mild reactive changes; otherwise, no significant  pathologic abnormality    CLINICAL HISTORY:  Generalized abdominal pain, diarrhea, nausea, hemorrhage of anus and rectum    SPECIMENS RECEIVED:  A.  SMALL BOWEL, BIOPSY  B.  ANTRUM, BIOPSY  C.  GE JUNCTION  D.  TERMINAL ILEUM BIOPSY  E.  COLON, BIOPSY  F.  RECTAL BIOPSY    MICROSCOPIC DESCRIPTION:  Tissue blocks are prepared and slides are examined microscopically on all  specimens. See diagnosis for details.    The internal and                           external (both positive and negative) controls reacted  appropriately. Some of our immunohistochemical and in situ hybridization  studies are performed as analyte specific reagents. The following statement  applies to those tests: This test was developed, and its performance  characteristics determined by [facility]. It has not been  cleared or approved by the US Food and Drug Administration. However, the  FDA has determined that approval and clearance are not necessary.    Professional interpretation rendered by Susan Del Rio D.O., F.C.A.P. at  [employer], [facility], [address] [place] [address].    GROSS DESCRIPTION:  A.  Specimen is received in 1 formalin filled container labeled \"small bowel  biopsy\" consists of 2 pieces of tan soft tissue measuring 0.5 x 0.3 x 0.2 cm  in aggregate.  All submitted in 1 cassette.  BKO  B.  Specimen received in 1 formalin filled container labeled \"antrum biopsy\"  consists of 3 pieces of tan soft tissue measuring 0.6 x 0.3                           x 0.2 cm in  aggregate.  All submitted in 1 cassette.  C.  " "Specimen is received in 1 formalin filled container labeled \"GE junction  biopsy\" consists of 2 pieces of tan soft tissue measuring 0.6 x 0.3 x 0.3 cm  in aggregate.  All submitted in 1 cassette.  D.  Specimen is received in 1 formalin filled container labeled \"terminal ileum  biopsy\" consists of 2 pieces of tan soft tissue measuring 0.7 x 0.3 x 0.2 cm  in aggregate.  All submitted in 1 cassette.  E.  Specimen received in 1 formalin filled container labeled \"colonic mucosa  biopsy\" consists of 3 pieces of tan soft tissue measuring 0.7 x 0.3 x 0.2 cm  in aggregate.  All submitted in 1 cassette.  F.  Specimen received in 1 formalin filled container labeled \"rectum biopsy\"  consists of 2 pieces of tan soft tissue measuring 0.4 x 0.3 x 0.2 cm in  aggregate.  All submitted in 1 cassette.  BKO    REVIEWED, DIAGNOSED AND ELECTRONICALLY  SIGNED BY:    Susan Del Rio D.O., RUSTAM.  CPT CODES:  88305x6, 88342x2       Assessment & Plan      1. Generalized abdominal pain    2. Other constipation    1.  Abdominal pain with nausea and weight loss, improving.  Continue PPI and obtain HIDA scan.  2.  Abdominal pain with constipation, add MiraLAX and high-fiber diet.  Increase p.o. fluid intake.  3.  History of H. pylori infection, obtain stool H. pylori antigen.  4.  Weight loss, improving.  Continue p.o. nutritional supplements.  5.  History of alcohol usage, off currently.  Patient to continue alcohol abstinence.       Orders placed during this encounter include:  Orders Placed This Encounter   Procedures   • NM Hepatobiliary Without CCK     Standing Status:   Future     Number of Occurrences:   1     Standing Expiration Date:   1/30/2024     Order Specific Question:   Do you want ejection fraction for this procedure?     Answer:   Yes       * Surgery not found *    Review and/or summary of lab tests, radiology, procedures, medications. Review and summary of old records and obtaining of history. The risks and benefits of my " recommendations, as well as other treatment options were discussed with the patient today. Questions were answered.    New Medications Ordered This Visit   Medications   • polyethylene glycol (MIRALAX) 17 g packet     Sig: Take 17 g by mouth Daily for 30 days.     Dispense:  30 packet     Refill:  6       Follow-up: Return in about 1 month (around 2023).               Results for orders placed or performed during the hospital encounter of 22   TISSUE EXAM, [facility] (ANDRZEJ,[facility])    Specimen: A: Small Intestine, Duodenum; Tissue    B: Gastric, Antrum; Tissue    C: Esophagus; Tissue    D: Small Intestine, Ileum; Tissue    E: Large Intestine; Tissue    F: Large Intestine, Rectum; Tissue   Result Value Ref Range    Reference Lab Report       [facility]  [address] [place] [address]  Phone: [phone] or [phone]  Fax: [phone]  Sergio Leyva M.D., Medical Director    PATIENT NAME                           LABORATORY NO.  1800  NIKKI RAMIREZVANI         [id number]  [id number]                         AGE              SEX  N           CLIENT REF #  [facility]           31      1990                    [id number]    [place]                       REQUESTING M.D.     ATTENDING M.D.     COPY TO[address]  DATE COLLECTED      DATE RECEIVED      DATE REPORTED  2022          2022         07/15/2022    DIAGNOSIS:  A.   SMALL BOWEL, BIOPSY:  Small bowel mucosa with no significant pathologic abnormality  B.   ANTRUM, BIOPSY:  Helicobacter pylori associated chronic gastritis with reactive gastropathy  H. pylori immunohistochemical stain is positive for  organisms  No intestinal metaplasia or dysplasia identified  C.   GE JUNCTION:  Helicobacter pylori associated chronic gastritis with reactive gastropathy  H. pylori immunohistochemical  "stain is positive for organisms  No intestinal metaplasia or dysplasia identified  No squamous mucosa identified  D.   TERMINAL ILEUM BIOPSY:  Small bowel mucosa with no significant pathologic abnormality  E.   COLON, BIOPSY:  Colonic mucosa with no significant pathologic abnormality  F.   RECTAL BIOPSY:  Rectal type mucosa with mild reactive changes; otherwise, no significant  pathologic abnormality    CLINICAL HISTORY:  Generalized abdominal pain, diarrhea, nausea, hemorrhage of anus and rectum    SPECIMENS RECEIVED:  A.  SMALL BOWEL, BIOPSY  B.  ANTRUM, BIOPSY  C.  GE JUNCTION  D.  TERMINAL ILEUM BIOPSY  E.  COLON, BIOPSY  F.  RECTAL BIOPSY    MICROSCOPIC DESCRIPTION:  Tissue blocks are prepared and slides are examined microscopically on all  specimens. See diagnosis for details.    The internal and  external (both positive and negative) controls reacted  appropriately. Some of our immunohistochemical and in situ hybridization  studies are performed as analyte specific reagents. The following statement  applies to those tests: This test was developed, and its performance  characteristics determined by [facility]. It has not been  cleared or approved by the US Food and Drug Administration. However, the  FDA has determined that approval and clearance are not necessary.    Professional interpretation rendered by Susan Del Rio D.O., F.C.A.P. at  [employer], [facility], [address] [place] [address].    GROSS DESCRIPTION:  A.  Specimen is received in 1 formalin filled container labeled \"small bowel  biopsy\" consists of 2 pieces of tan soft tissue measuring 0.5 x 0.3 x 0.2 cm  in aggregate.  All submitted in 1 cassette.  BKO  B.  Specimen received in 1 formalin filled container labeled \"antrum biopsy\"  consists of 3 pieces of tan soft tissue measuring 0.6 x 0.3  x 0.2 cm in  aggregate.  All submitted in 1 cassette.  C.  Specimen is received in 1 formalin filled container labeled \"GE " "junction  biopsy\" consists of 2 pieces of tan soft tissue measuring 0.6 x 0.3 x 0.3 cm  in aggregate.  All submitted in 1 cassette.  D.  Specimen is received in 1 formalin filled container labeled \"terminal ileum  biopsy\" consists of 2 pieces of tan soft tissue measuring 0.7 x 0.3 x 0.2 cm  in aggregate.  All submitted in 1 cassette.  E.  Specimen received in 1 formalin filled container labeled \"colonic mucosa  biopsy\" consists of 3 pieces of tan soft tissue measuring 0.7 x 0.3 x 0.2 cm  in aggregate.  All submitted in 1 cassette.  F.  Specimen received in 1 formalin filled container labeled \"rectum biopsy\"  consists of 2 pieces of tan soft tissue measuring 0.4 x 0.3 x 0.2 cm in  aggregate.  All submitted in 1 cassette.  BKO    REVIEWED, DIAGNOSED AND ELECTRONICALLY  SIGNED BY:    Susan Del Rio D.O., SUMI  CPT CODES:  88305x6, 88342x2     Results for orders placed or performed during the hospital encounter of 06/07/22   Gold Top - SST   Result Value Ref Range    Extra Tube Hold for add-ons.    Green Top (Gel)   Result Value Ref Range    Extra Tube Hold for add-ons.    Urinalysis, Microscopic Only - Urine, Clean Catch    Specimen: Urine, Clean Catch   Result Value Ref Range    RBC, UA 3-5 (A) None Seen /HPF    WBC, UA 0-2 None Seen, 0-2, 3-5 /HPF    Bacteria, UA None Seen None Seen /HPF    Squamous Epithelial Cells, UA 0-2 None Seen, 0-2 /HPF    Hyaline Casts, UA None Seen None Seen /LPF    Methodology Automated Microscopy    Urinalysis With Microscopic If Indicated (No Culture) - Urine, Clean Catch    Specimen: Urine, Clean Catch   Result Value Ref Range    Color, UA Yellow Yellow, Straw, Dark Yellow, Nini    Appearance, UA Clear Clear    pH, UA <=5.0 5.0 - 9.0    Specific Gravity, UA 1.020 1.003 - 1.030    Glucose, UA Negative Negative    Ketones, UA Negative Negative    Bilirubin, UA Negative Negative    Blood, UA Moderate (2+) (A) Negative    Protein, UA Negative Negative    Leuk Esterase, UA Negative " Negative    Nitrite, UA Negative Negative    Urobilinogen, UA 1.0 E.U./dL 0.2 - 1.0 E.U./dL   CBC Auto Differential    Specimen: Blood   Result Value Ref Range    WBC 9.25 3.40 - 10.80 10*3/mm3    RBC 4.89 4.14 - 5.80 10*6/mm3    Hemoglobin 14.8 13.0 - 17.7 g/dL    Hematocrit 43.0 37.5 - 51.0 %    MCV 87.9 79.0 - 97.0 fL    MCH 30.3 26.6 - 33.0 pg    MCHC 34.4 31.5 - 35.7 g/dL    RDW 12.5 12.3 - 15.4 %    RDW-SD 40.4 37.0 - 54.0 fl    MPV 9.7 6.0 - 12.0 fL    Platelets 282 140 - 450 10*3/mm3    Neutrophil % 57.9 42.7 - 76.0 %    Lymphocyte % 30.1 19.6 - 45.3 %    Monocyte % 8.9 5.0 - 12.0 %    Eosinophil % 2.1 0.3 - 6.2 %    Basophil % 0.5 0.0 - 1.5 %    Immature Grans % 0.5 0.0 - 0.5 %    Neutrophils, Absolute 5.36 1.70 - 7.00 10*3/mm3    Lymphocytes, Absolute 2.78 0.70 - 3.10 10*3/mm3    Monocytes, Absolute 0.82 0.10 - 0.90 10*3/mm3    Eosinophils, Absolute 0.19 0.00 - 0.40 10*3/mm3    Basophils, Absolute 0.05 0.00 - 0.20 10*3/mm3    Immature Grans, Absolute 0.05 0.00 - 0.05 10*3/mm3    nRBC 0.0 0.0 - 0.2 /100 WBC   Lavender Top   Result Value Ref Range    Extra Tube hold for add-on    Light Blue Top   Result Value Ref Range    Extra Tube Hold for add-ons.    Lipase    Specimen: Blood   Result Value Ref Range    Lipase 30 13 - 60 U/L   Comprehensive Metabolic Panel    Specimen: Blood   Result Value Ref Range    Glucose 98 65 - 99 mg/dL    BUN 14 6 - 20 mg/dL    Creatinine 0.83 0.76 - 1.27 mg/dL    Sodium 142 136 - 145 mmol/L    Potassium 3.7 3.5 - 5.2 mmol/L    Chloride 105 98 - 107 mmol/L    CO2 25.0 22.0 - 29.0 mmol/L    Calcium 9.6 8.6 - 10.5 mg/dL    Total Protein 7.7 6.0 - 8.5 g/dL    Albumin 4.60 3.50 - 5.20 g/dL    ALT (SGPT) 27 1 - 41 U/L    AST (SGOT) 22 1 - 40 U/L    Alkaline Phosphatase 88 39 - 117 U/L    Total Bilirubin 0.2 0.0 - 1.2 mg/dL    Globulin 3.1 gm/dL    A/G Ratio 1.5 g/dL    BUN/Creatinine Ratio 16.9 7.0 - 25.0    Anion Gap 12.0 5.0 - 15.0 mmol/L    eGFR 121.5 >60.0 mL/min/1.73         This  document has been electronically signed by Amanda Mcclellan MD on February 14, 2023 07:20 CST

## 2023-02-28 ENCOUNTER — OFFICE VISIT (OUTPATIENT)
Dept: GASTROENTEROLOGY | Facility: CLINIC | Age: 33
End: 2023-02-28

## 2023-02-28 VITALS
BODY MASS INDEX: 22.23 KG/M2 | SYSTOLIC BLOOD PRESSURE: 122 MMHG | HEIGHT: 65 IN | WEIGHT: 133.4 LBS | HEART RATE: 76 BPM | DIASTOLIC BLOOD PRESSURE: 75 MMHG

## 2023-02-28 DIAGNOSIS — K81.9 CHOLECYSTITIS: ICD-10-CM

## 2023-02-28 DIAGNOSIS — K21.00 GASTROESOPHAGEAL REFLUX DISEASE WITH ESOPHAGITIS WITHOUT HEMORRHAGE: Primary | ICD-10-CM

## 2023-02-28 PROCEDURE — 99213 OFFICE O/P EST LOW 20 MIN: CPT | Performed by: INTERNAL MEDICINE

## 2023-02-28 RX ORDER — DICYCLOMINE HYDROCHLORIDE 10 MG/1
10 CAPSULE ORAL
Qty: 30 CAPSULE | Refills: 0 | Status: SHIPPED | OUTPATIENT
Start: 2023-02-28 | End: 2023-03-27

## 2023-02-28 RX ORDER — HYDROCORTISONE 25 MG/G
CREAM TOPICAL 2 TIMES DAILY
Qty: 28 G | Refills: 0 | Status: SHIPPED | OUTPATIENT
Start: 2023-02-28 | End: 2023-03-15 | Stop reason: SDUPTHER

## 2023-03-13 ENCOUNTER — OFFICE VISIT (OUTPATIENT)
Dept: SURGERY | Facility: CLINIC | Age: 33
End: 2023-03-13

## 2023-03-13 VITALS
OXYGEN SATURATION: 99 % | SYSTOLIC BLOOD PRESSURE: 115 MMHG | WEIGHT: 130.6 LBS | BODY MASS INDEX: 21.76 KG/M2 | HEART RATE: 78 BPM | TEMPERATURE: 97.6 F | HEIGHT: 65 IN | DIASTOLIC BLOOD PRESSURE: 62 MMHG

## 2023-03-13 DIAGNOSIS — R10.84 GENERALIZED ABDOMINAL PAIN: Primary | ICD-10-CM

## 2023-03-13 PROCEDURE — 99204 OFFICE O/P NEW MOD 45 MIN: CPT | Performed by: STUDENT IN AN ORGANIZED HEALTH CARE EDUCATION/TRAINING PROGRAM

## 2023-03-13 RX ORDER — BUPIVACAINE HCL/0.9 % NACL/PF 0.1 %
2 PLASTIC BAG, INJECTION (ML) EPIDURAL ONCE
Status: CANCELLED | OUTPATIENT
Start: 2023-03-17 | End: 2023-03-13

## 2023-03-13 RX ORDER — INDOCYANINE GREEN AND WATER 25 MG
5 KIT INJECTION ONCE
Status: CANCELLED | OUTPATIENT
Start: 2023-03-17 | End: 2023-03-13

## 2023-03-13 NOTE — H&P (VIEW-ONLY)
Subjective   Christian Gutierrez is a 32 y.o. male     Chief Complaint: Abdominal pain    History of Present Illness  31 yo gentleman with chronic abdominal pain for better than a year. This is worse with food and associated with nausea/vomiting. It is usually in his midepigastrum and radiates to the back occasionally. He has undergone and EGD and colonoscopy with no significant abnormality. He underwent an US and HIDA which showed no stones but did show a decreased GB ejection fraction. He presents to discuss cholecystectomy.        Review of Systems   Gastrointestinal: Positive for abdominal pain, constipation, nausea and vomiting.   Musculoskeletal: Positive for back pain.     History reviewed. No pertinent past medical history.  Past Surgical History:   Procedure Laterality Date   • COLONOSCOPY N/A 7/12/2022    Procedure: COLONOSCOPY;  Surgeon: Amanda Mcclellan MD;  Location: [facility] ENDOSCOPY;  Service: Gastroenterology;  Laterality: N/A;   • ENDOSCOPY N/A 7/12/2022    Procedure: ESOPHAGOGASTRODUODENOSCOPY;  Surgeon: Amanda Mcclellan MD;  Location: [facility] ENDOSCOPY;  Service: Gastroenterology;  Laterality: N/A;     Family History   Problem Relation Age of Onset   • Alcohol abuse Father    • Liver cancer Maternal Aunt      Social History     Socioeconomic History   • Marital status: Single   Tobacco Use   • Smoking status: Never   • Smokeless tobacco: Never   Vaping Use   • Vaping Use: Never used   Substance and Sexual Activity   • Alcohol use: Never   • Drug use: Defer   • Sexual activity: Defer     No Known Allergies  There were no vitals filed for this visit.    Home Medications:  Prior to Admission medications    Medication Sig Start Date End Date Taking? Authorizing Provider   dicyclomine (BENTYL) 10 MG capsule Take 1 capsule by mouth 4 (Four) Times a Day Before Meals & at  Bedtime. 2/28/23  Yes Debbie Neville MD   Probiotic Product (PROBIOTIC-10 PO) Take  by mouth.   Yes Provider, MD Juwan   sucralfate (CARAFATE) 1 g tablet Take 1 tablet by mouth 4 (Four) Times a Day.   Yes Provider, MD Juwan   Hydrocortisone, Perianal, (ANUSOL-HC) 2.5 % rectal cream Insert  into the rectum 2 (Two) Times a Day. 2/28/23   Debbie Neville MD   omeprazole (priLOSEC) 40 MG capsule Take 1 capsule by mouth Daily. 6/20/22   Amanda Mcclellan MD       Objective   Physical Exam  Constitutional:       Appearance: Normal appearance.   HENT:      Head: Normocephalic and atraumatic.      Nose: Nose normal. No congestion.      Mouth/Throat:      Mouth: Mucous membranes are moist.      Pharynx: Oropharynx is clear.   Eyes:      General: No scleral icterus.     Pupils: Pupils are equal, round, and reactive to light.   Cardiovascular:      Rate and Rhythm: Normal rate and regular rhythm.   Pulmonary:      Effort: Pulmonary effort is normal. No respiratory distress.   Abdominal:      General: Abdomen is flat.      Comments: Tender in the midepigastrum.   Skin:     General: Skin is warm and dry.   Neurological:      General: No focal deficit present.      Mental Status: He is alert and oriented to person, place, and time.   Psychiatric:         Mood and Affect: Mood normal.         Behavior: Behavior normal.         Assessment & Plan       31 yo gentleman with biliary dyskinesia    - Long discussion with the patient about taking out his gallbladder. He has significant abdominal complaints and I think it is reasonable to take his gallbladder out to see if this helps. I told him that given his lack of stones there is proabbly an 80 percent chance of significant improvement in his symptoms. I told him I do not think that it is dangerous to wait if he wishes to hold off on surgery. However he wishes to go ahead with surgery. I offered the patient a robotic cholecystectomy and discussed the  risks, benefits and alteratives to surgery. The risks of the surgery are bleeding, infection, damage to surrounding structures (common bile duct or intestine), pain and scarring. The benefits are likely resolution of the pain. Alternatively we could not operate on the patient. The patient understands these risks and wishes to proceed with robotic assisted cholecystectomy.                     This document has been electronically signed by Rox Chavez on March 13, 2023 10:42 CDT

## 2023-03-13 NOTE — PROGRESS NOTES
Subjective   Christian Gutierrez is a 32 y.o. male     Chief Complaint: Abdominal pain    History of Present Illness  31 yo gentleman with chronic abdominal pain for better than a year. This is worse with food and associated with nausea/vomiting. It is usually in his midepigastrum and radiates to the back occasionally. He has undergone and EGD and colonoscopy with no significant abnormality. He underwent an US and HIDA which showed no stones but did show a decreased GB ejection fraction. He presents to discuss cholecystectomy.        Review of Systems   Gastrointestinal: Positive for abdominal pain, constipation, nausea and vomiting.   Musculoskeletal: Positive for back pain.     History reviewed. No pertinent past medical history.  Past Surgical History:   Procedure Laterality Date   • COLONOSCOPY N/A 7/12/2022    Procedure: COLONOSCOPY;  Surgeon: Amanda Mcclellan MD;  Location: [facility] ENDOSCOPY;  Service: Gastroenterology;  Laterality: N/A;   • ENDOSCOPY N/A 7/12/2022    Procedure: ESOPHAGOGASTRODUODENOSCOPY;  Surgeon: Amanda Mcclellan MD;  Location: [facility] ENDOSCOPY;  Service: Gastroenterology;  Laterality: N/A;     Family History   Problem Relation Age of Onset   • Alcohol abuse Father    • Liver cancer Maternal Aunt      Social History     Socioeconomic History   • Marital status: Single   Tobacco Use   • Smoking status: Never   • Smokeless tobacco: Never   Vaping Use   • Vaping Use: Never used   Substance and Sexual Activity   • Alcohol use: Never   • Drug use: Defer   • Sexual activity: Defer     No Known Allergies  There were no vitals filed for this visit.    Home Medications:  Prior to Admission medications    Medication Sig Start Date End Date Taking? Authorizing Provider   dicyclomine (BENTYL) 10 MG capsule Take 1 capsule by mouth 4 (Four) Times a Day Before Meals & at  Bedtime. 2/28/23  Yes Debbie Neville MD   Probiotic Product (PROBIOTIC-10 PO) Take  by mouth.   Yes Provider, MD Juwan   sucralfate (CARAFATE) 1 g tablet Take 1 tablet by mouth 4 (Four) Times a Day.   Yes Provider, MD Juwan   Hydrocortisone, Perianal, (ANUSOL-HC) 2.5 % rectal cream Insert  into the rectum 2 (Two) Times a Day. 2/28/23   Debbie Neville MD   omeprazole (priLOSEC) 40 MG capsule Take 1 capsule by mouth Daily. 6/20/22   Amanda Mcclellan MD       Objective   Physical Exam  Constitutional:       Appearance: Normal appearance.   HENT:      Head: Normocephalic and atraumatic.      Nose: Nose normal. No congestion.      Mouth/Throat:      Mouth: Mucous membranes are moist.      Pharynx: Oropharynx is clear.   Eyes:      General: No scleral icterus.     Pupils: Pupils are equal, round, and reactive to light.   Cardiovascular:      Rate and Rhythm: Normal rate and regular rhythm.   Pulmonary:      Effort: Pulmonary effort is normal. No respiratory distress.   Abdominal:      General: Abdomen is flat.      Comments: Tender in the midepigastrum.   Skin:     General: Skin is warm and dry.   Neurological:      General: No focal deficit present.      Mental Status: He is alert and oriented to person, place, and time.   Psychiatric:         Mood and Affect: Mood normal.         Behavior: Behavior normal.         Assessment & Plan       33 yo gentleman with biliary dyskinesia    - Long discussion with the patient about taking out his gallbladder. He has significant abdominal complaints and I think it is reasonable to take his gallbladder out to see if this helps. I told him that given his lack of stones there is proabbly an 80 percent chance of significant improvement in his symptoms. I told him I do not think that it is dangerous to wait if he wishes to hold off on surgery. However he wishes to go ahead with surgery. I offered the patient a robotic cholecystectomy and discussed the  risks, benefits and alteratives to surgery. The risks of the surgery are bleeding, infection, damage to surrounding structures (common bile duct or intestine), pain and scarring. The benefits are likely resolution of the pain. Alternatively we could not operate on the patient. The patient understands these risks and wishes to proceed with robotic assisted cholecystectomy.                     This document has been electronically signed by Rox Chavez on March 13, 2023 10:42 CDT

## 2023-03-15 NOTE — PROGRESS NOTES
Chief Complaint   Patient presents with   • f/u after hida scan        Subjective    Herericka Gutierrez is a 32 y.o. male.    History of Present Illness  Patient presented to GI clinic for follow-up visit today.  Has intermittent symptoms with upper abdominal pain.  Denies nausea or vomiting.  Bowel movements regular.  Weight is stable.  HIDA scan was consistent with reduced gallbladder ejection fraction of 30%.       The following portions of the patient's history were reviewed and updated as appropriate:   History reviewed. No pertinent past medical history.  Past Surgical History:   Procedure Laterality Date   • COLONOSCOPY N/A 7/12/2022    Procedure: COLONOSCOPY;  Surgeon: Amanda Mcclellan MD;  Location: [facility] ENDOSCOPY;  Service: Gastroenterology;  Laterality: N/A;   • ENDOSCOPY N/A 7/12/2022    Procedure: ESOPHAGOGASTRODUODENOSCOPY;  Surgeon: Amanda Mcclellan MD;  Location: [facility] ENDOSCOPY;  Service: Gastroenterology;  Laterality: N/A;     Family History   Problem Relation Age of Onset   • Alcohol abuse Father    • Liver cancer Maternal Aunt        Prior to Admission medications    Medication Sig Start Date End Date Taking? Authorizing Provider   omeprazole (priLOSEC) 40 MG capsule Take 1 capsule by mouth Daily. 6/20/22  Yes Amanda Mcclellan MD   sucralfate (CARAFATE) 1 g tablet Take 1 tablet by mouth 4 (Four) Times a Day.   Yes ProviderJuwan MD   dicyclomine (BENTYL) 10 MG capsule Take 1 capsule by mouth 4 (Four) Times a Day Before Meals & at Bedtime. 2/28/23   Debbie Neville MD   Hydrocortisone, Perianal, (ANUSOL-HC) 2.5 % rectal cream Insert  into the rectum 2 (Two) Times a Day. 2/28/23   Debbie Neville MD   Probiotic Product (PROBIOTIC-10 PO) Take  by mouth.    ProviderJuwan MD     No Known Allergies  Social History     Socioeconomic History   • Marital status: Single   Tobacco Use   • Smoking status: Never   • Smokeless tobacco: Never   Vaping Use   •  "Vaping Use: Never used   Substance and Sexual Activity   • Alcohol use: Never   • Drug use: Defer   • Sexual activity: Defer       Review of Systems  Review of Systems   Constitutional: Negative for chills, fatigue, fever and unexpected weight change.   HENT: Negative for congestion, ear discharge, hearing loss, nosebleeds and sore throat.    Eyes: Negative for pain, discharge and redness.   Respiratory: Negative for cough, chest tightness, shortness of breath and wheezing.    Cardiovascular: Negative for chest pain and palpitations.   Gastrointestinal: Positive for abdominal pain. Negative for abdominal distention, blood in stool, constipation, diarrhea, nausea and vomiting.   Endocrine: Negative for cold intolerance, polydipsia, polyphagia and polyuria.   Genitourinary: Negative for dysuria, flank pain, frequency, hematuria and urgency.   Musculoskeletal: Negative for arthralgias, back pain, joint swelling and myalgias.   Skin: Negative for color change, pallor and rash.   Neurological: Negative for tremors, seizures, syncope, weakness and headaches.   Hematological: Negative for adenopathy. Does not bruise/bleed easily.   Psychiatric/Behavioral: Negative for behavioral problems, confusion, dysphoric mood, hallucinations and suicidal ideas. The patient is not nervous/anxious.         /75 (BP Location: Right arm)   Pulse 76   Ht 165.1 cm (65\")   Wt 60.5 kg (133 lb 6.4 oz)   BMI 22.20 kg/m²     Objective    Physical Exam  Constitutional:       Appearance: He is well-developed.   HENT:      Head: Normocephalic and atraumatic.   Eyes:      Conjunctiva/sclera: Conjunctivae normal.      Pupils: Pupils are equal, round, and reactive to light.   Neck:      Thyroid: No thyromegaly.   Cardiovascular:      Rate and Rhythm: Normal rate and regular rhythm.      Heart sounds: Normal heart sounds. No murmur heard.  Pulmonary:      Effort: Pulmonary effort is normal.      Breath sounds: Normal breath sounds. No " wheezing.   Abdominal:      General: Bowel sounds are normal. There is no distension.      Palpations: Abdomen is soft. There is no mass.      Tenderness: There is no abdominal tenderness.      Hernia: No hernia is present.   Genitourinary:     Comments: No lesions noted  Musculoskeletal:         General: No tenderness. Normal range of motion.      Cervical back: Normal range of motion and neck supple.   Lymphadenopathy:      Cervical: No cervical adenopathy.   Skin:     General: Skin is warm and dry.      Findings: No rash.   Neurological:      Mental Status: He is alert and oriented to person, place, and time.      Cranial Nerves: No cranial nerve deficit.   Psychiatric:         Thought Content: Thought content normal.       Admission on 2022, Discharged on 2022   Component Date Value Ref Range Status   • Reference Lab Report 2022    Final                    Value:[facility]  [address] [place] [address]  Phone: [phone] or [phone]  Fax: [phone]  Sergio Leyva M.D., Medical Director    PATIENT NAME                           LABORATORY NO.  1800  NIKKI RAMIREZI         [id number]  [id number]                         AGE              SEX  SSN           CLIENT REF #  [facility]           31      1990                    [id number]    [place]                       REQUESTING M.D.     ATTENDING M.D.     COPY TO.  [address] [place] [address]  DATE COLLECTED      DATE RECEIVED      DATE REPORTED  2022          2022         07/15/2022    DIAGNOSIS:  A.   SMALL BOWEL, BIOPSY:  Small bowel mucosa with no significant pathologic abnormality  B.   ANTRUM, BIOPSY:  Helicobacter pylori associated chronic gastritis with reactive gastropathy  H. pylori immunohistochemical stain is positive for                           organisms  No intestinal  "metaplasia or dysplasia identified  C.   GE JUNCTION:  Helicobacter pylori associated chronic gastritis with reactive gastropathy  H. pylori immunohistochemical stain is positive for organisms  No intestinal metaplasia or dysplasia identified  No squamous mucosa identified  D.   TERMINAL ILEUM BIOPSY:  Small bowel mucosa with no significant pathologic abnormality  E.   COLON, BIOPSY:  Colonic mucosa with no significant pathologic abnormality  F.   RECTAL BIOPSY:  Rectal type mucosa with mild reactive changes; otherwise, no significant  pathologic abnormality    CLINICAL HISTORY:  Generalized abdominal pain, diarrhea, nausea, hemorrhage of anus and rectum    SPECIMENS RECEIVED:  A.  SMALL BOWEL, BIOPSY  B.  ANTRUM, BIOPSY  C.  GE JUNCTION  D.  TERMINAL ILEUM BIOPSY  E.  COLON, BIOPSY  F.  RECTAL BIOPSY    MICROSCOPIC DESCRIPTION:  Tissue blocks are prepared and slides are examined microscopically on all  specimens. See diagnosis for details.    The internal and                           external (both positive and negative) controls reacted  appropriately. Some of our immunohistochemical and in situ hybridization  studies are performed as analyte specific reagents. The following statement  applies to those tests: This test was developed, and its performance  characteristics determined by [facility]. It has not been  cleared or approved by the US Food and Drug Administration. However, the  FDA has determined that approval and clearance are not necessary.    Professional interpretation rendered by Susan Del Rio D.O., F.C.A.P. at  [facility], [facility], [address] [place] [address].    GROSS DESCRIPTION:  A.  Specimen is received in 1 formalin filled container labeled \"small bowel  biopsy\" consists of 2 pieces of tan soft tissue measuring 0.5 x 0.3 x 0.2 cm  in aggregate.  All submitted in 1 cassette.  BKO  B.  Specimen received in 1 formalin filled container labeled \"antrum " "biopsy\"  consists of 3 pieces of tan soft tissue measuring 0.6 x 0.3                           x 0.2 cm in  aggregate.  All submitted in 1 cassette.  C.  Specimen is received in 1 formalin filled container labeled \"GE junction  biopsy\" consists of 2 pieces of tan soft tissue measuring 0.6 x 0.3 x 0.3 cm  in aggregate.  All submitted in 1 cassette.  D.  Specimen is received in 1 formalin filled container labeled \"terminal ileum  biopsy\" consists of 2 pieces of tan soft tissue measuring 0.7 x 0.3 x 0.2 cm  in aggregate.  All submitted in 1 cassette.  E.  Specimen received in 1 formalin filled container labeled \"colonic mucosa  biopsy\" consists of 3 pieces of tan soft tissue measuring 0.7 x 0.3 x 0.2 cm  in aggregate.  All submitted in 1 cassette.  F.  Specimen received in 1 formalin filled container labeled \"rectum biopsy\"  consists of 2 pieces of tan soft tissue measuring 0.4 x 0.3 x 0.2 cm in  aggregate.  All submitted in 1 cassette.  BKO    REVIEWED, DIAGNOSED AND ELECTRONICALLY  SIGNED BY:    Susan Del Rio D.O., FHalieC.A.P.  CPT CODES:  88305x6, 88342x2       Assessment & Plan      1. Gastroesophageal reflux disease with esophagitis without hemorrhage    2. Cholecystitis    1.  Upper abdominal pain, likely due to biliary dyskinesia.  Refer patient to surgery for cholecystectomy.  2.  Nausea and vomiting, improved.  Continue PPI.  3.  Abdominal pain with constipation, well controlled.  Continue MiraLAX and high-fiber diet.  4.  Weight loss, improving.  Continue p.o. nutritional supplements.  5.  History of H. pylori infection, recent stool antigen was unremarkable.  6.  History of alcohol usage, currently.  Patient to continue alcohol abstinence.       Orders placed during this encounter include:  Orders Placed This Encounter   Procedures   • Ambulatory Referral to General Surgery     Referral Priority:   Routine     Referral Type:   Consultation     Referral Reason:   Specialty Services Required     Referred to " Provider:   Terell Croft MD     Requested Specialty:   General Surgery     Number of Visits Requested:   1       * Surgery not found *    Review and/or summary of lab tests, radiology, procedures, medications. Review and summary of old records and obtaining of history. The risks and benefits of my recommendations, as well as other treatment options were discussed with the patient today. Questions were answered.    New Medications Ordered This Visit   Medications   • Hydrocortisone, Perianal, (ANUSOL-HC) 2.5 % rectal cream     Sig: Insert  into the rectum 2 (Two) Times a Day.     Dispense:  28 g     Refill:  0   • dicyclomine (BENTYL) 10 MG capsule     Sig: Take 1 capsule by mouth 4 (Four) Times a Day Before Meals & at Bedtime.     Dispense:  30 capsule     Refill:  0       Follow-up: Return in about 1 month (around 3/28/2023).               Results for orders placed or performed during the hospital encounter of 22   TISSUE EXAM, [facility] ([facility])    Specimen: A: Small Intestine, Duodenum; Tissue    B: Gastric, Antrum; Tissue    C: Esophagus; Tissue    D: Small Intestine, Ileum; Tissue    E: Large Intestine; Tissue    F: Large Intestine, Rectum; Tissue   Result Value Ref Range    Reference Lab Report       [facility]  [address] [place] [address]  Phone: [phone] or [phone]  Fax: [phone]  Sergio Leyva M.D., Medical Director    PATIENT NAME                           LABORATORY NO.  1800  NIKKI RAMIREZI         [id number]  [id number]                         AGE              SEX  SSN           CLIENT REF #  [facility]           1990  GABI                  [id number]    [place]                       REQUESTING MHOME     ATTENDING M.D.     COPY TO[address]  DATE COLLECTED      DATE RECEIVED      DATE REPORTED  2022           07/13/2022         07/15/2022    DIAGNOSIS:  A.   SMALL BOWEL, BIOPSY:  Small bowel mucosa with no significant pathologic abnormality  B.   ANTRUM, BIOPSY:  Helicobacter pylori associated chronic gastritis with reactive gastropathy  H. pylori immunohistochemical stain is positive for  organisms  No intestinal metaplasia or dysplasia identified  C.   GE JUNCTION:  Helicobacter pylori associated chronic gastritis with reactive gastropathy  H. pylori immunohistochemical stain is positive for organisms  No intestinal metaplasia or dysplasia identified  No squamous mucosa identified  D.   TERMINAL ILEUM BIOPSY:  Small bowel mucosa with no significant pathologic abnormality  E.   COLON, BIOPSY:  Colonic mucosa with no significant pathologic abnormality  F.   RECTAL BIOPSY:  Rectal type mucosa with mild reactive changes; otherwise, no significant  pathologic abnormality    CLINICAL HISTORY:  Generalized abdominal pain, diarrhea, nausea, hemorrhage of anus and rectum    SPECIMENS RECEIVED:  A.  SMALL BOWEL, BIOPSY  B.  ANTRUM, BIOPSY  C.  GE JUNCTION  D.  TERMINAL ILEUM BIOPSY  E.  COLON, BIOPSY  F.  RECTAL BIOPSY    MICROSCOPIC DESCRIPTION:  Tissue blocks are prepared and slides are examined microscopically on all  specimens. See diagnosis for details.    The internal and  external (both positive and negative) controls reacted  appropriately. Some of our immunohistochemical and in situ hybridization  studies are performed as analyte specific reagents. The following statement  applies to those tests: This test was developed, and its performance  characteristics determined by [facility]. It has not been  cleared or approved by the [place] Food and Drug Administration. However, the  FDA has determined that approval and clearance are not necessary.    Professional interpretation rendered by Susan Del Rio D.O., F.C.A.P. at  [facility], [facility], [address] [place] [address].    GROSS  "DESCRIPTION:  A.  Specimen is received in 1 formalin filled container labeled \"small bowel  biopsy\" consists of 2 pieces of tan soft tissue measuring 0.5 x 0.3 x 0.2 cm  in aggregate.  All submitted in 1 cassette.  BKO  B.  Specimen received in 1 formalin filled container labeled \"antrum biopsy\"  consists of 3 pieces of tan soft tissue measuring 0.6 x 0.3  x 0.2 cm in  aggregate.  All submitted in 1 cassette.  C.  Specimen is received in 1 formalin filled container labeled \"GE junction  biopsy\" consists of 2 pieces of tan soft tissue measuring 0.6 x 0.3 x 0.3 cm  in aggregate.  All submitted in 1 cassette.  D.  Specimen is received in 1 formalin filled container labeled \"terminal ileum  biopsy\" consists of 2 pieces of tan soft tissue measuring 0.7 x 0.3 x 0.2 cm  in aggregate.  All submitted in 1 cassette.  E.  Specimen received in 1 formalin filled container labeled \"colonic mucosa  biopsy\" consists of 3 pieces of tan soft tissue measuring 0.7 x 0.3 x 0.2 cm  in aggregate.  All submitted in 1 cassette.  F.  Specimen received in 1 formalin filled container labeled \"rectum biopsy\"  consists of 2 pieces of tan soft tissue measuring 0.4 x 0.3 x 0.2 cm in  aggregate.  All submitted in 1 cassette.  BKO    REVIEWED, DIAGNOSED AND ELECTRONICALLY  SIGNED BY:    Susan Del Rio D.O., RUSTAM.  CPT CODES:  88305x6, 88342x2     Results for orders placed or performed during the hospital encounter of 06/07/22   Gold Top - SST   Result Value Ref Range    Extra Tube Hold for add-ons.    Green Top (Gel)   Result Value Ref Range    Extra Tube Hold for add-ons.    Urinalysis, Microscopic Only - Urine, Clean Catch    Specimen: Urine, Clean Catch   Result Value Ref Range    RBC, UA 3-5 (A) None Seen /HPF    WBC, UA 0-2 None Seen, 0-2, 3-5 /HPF    Bacteria, UA None Seen None Seen /HPF    Squamous Epithelial Cells, UA 0-2 None Seen, 0-2 /HPF    Hyaline Casts, UA None Seen None Seen /LPF    Methodology Automated Microscopy  "   Urinalysis With Microscopic If Indicated (No Culture) - Urine, Clean Catch    Specimen: Urine, Clean Catch   Result Value Ref Range    Color, UA Yellow Yellow, Straw, Dark Yellow, Nini    Appearance, UA Clear Clear    pH, UA <=5.0 5.0 - 9.0    Specific Gravity, UA 1.020 1.003 - 1.030    Glucose, UA Negative Negative    Ketones, UA Negative Negative    Bilirubin, UA Negative Negative    Blood, UA Moderate (2+) (A) Negative    Protein, UA Negative Negative    Leuk Esterase, UA Negative Negative    Nitrite, UA Negative Negative    Urobilinogen, UA 1.0 E.U./dL 0.2 - 1.0 E.U./dL   CBC Auto Differential    Specimen: Blood   Result Value Ref Range    WBC 9.25 3.40 - 10.80 10*3/mm3    RBC 4.89 4.14 - 5.80 10*6/mm3    Hemoglobin 14.8 13.0 - 17.7 g/dL    Hematocrit 43.0 37.5 - 51.0 %    MCV 87.9 79.0 - 97.0 fL    MCH 30.3 26.6 - 33.0 pg    MCHC 34.4 31.5 - 35.7 g/dL    RDW 12.5 12.3 - 15.4 %    RDW-SD 40.4 37.0 - 54.0 fl    MPV 9.7 6.0 - 12.0 fL    Platelets 282 140 - 450 10*3/mm3    Neutrophil % 57.9 42.7 - 76.0 %    Lymphocyte % 30.1 19.6 - 45.3 %    Monocyte % 8.9 5.0 - 12.0 %    Eosinophil % 2.1 0.3 - 6.2 %    Basophil % 0.5 0.0 - 1.5 %    Immature Grans % 0.5 0.0 - 0.5 %    Neutrophils, Absolute 5.36 1.70 - 7.00 10*3/mm3    Lymphocytes, Absolute 2.78 0.70 - 3.10 10*3/mm3    Monocytes, Absolute 0.82 0.10 - 0.90 10*3/mm3    Eosinophils, Absolute 0.19 0.00 - 0.40 10*3/mm3    Basophils, Absolute 0.05 0.00 - 0.20 10*3/mm3    Immature Grans, Absolute 0.05 0.00 - 0.05 10*3/mm3    nRBC 0.0 0.0 - 0.2 /100 WBC   Lavender Top   Result Value Ref Range    Extra Tube hold for add-on    Light Blue Top   Result Value Ref Range    Extra Tube Hold for add-ons.    Lipase    Specimen: Blood   Result Value Ref Range    Lipase 30 13 - 60 U/L   Comprehensive Metabolic Panel    Specimen: Blood   Result Value Ref Range    Glucose 98 65 - 99 mg/dL    BUN 14 6 - 20 mg/dL    Creatinine 0.83 0.76 - 1.27 mg/dL    Sodium 142 136 - 145 mmol/L     Potassium 3.7 3.5 - 5.2 mmol/L    Chloride 105 98 - 107 mmol/L    CO2 25.0 22.0 - 29.0 mmol/L    Calcium 9.6 8.6 - 10.5 mg/dL    Total Protein 7.7 6.0 - 8.5 g/dL    Albumin 4.60 3.50 - 5.20 g/dL    ALT (SGPT) 27 1 - 41 U/L    AST (SGOT) 22 1 - 40 U/L    Alkaline Phosphatase 88 39 - 117 U/L    Total Bilirubin 0.2 0.0 - 1.2 mg/dL    Globulin 3.1 gm/dL    A/G Ratio 1.5 g/dL    BUN/Creatinine Ratio 16.9 7.0 - 25.0    Anion Gap 12.0 5.0 - 15.0 mmol/L    eGFR 121.5 >60.0 mL/min/1.73         This document has been electronically signed by Amanda Mcclellan MD on March 15, 2023 07:59 CDT

## 2023-03-16 ENCOUNTER — ANESTHESIA EVENT (OUTPATIENT)
Dept: PERIOP | Facility: HOSPITAL | Age: 33
End: 2023-03-16

## 2023-03-17 ENCOUNTER — ANESTHESIA (OUTPATIENT)
Dept: PERIOP | Facility: HOSPITAL | Age: 33
End: 2023-03-17

## 2023-03-17 ENCOUNTER — HOSPITAL ENCOUNTER (OUTPATIENT)
Facility: HOSPITAL | Age: 33
Setting detail: HOSPITAL OUTPATIENT SURGERY
Discharge: HOME | End: 2023-03-17
Attending: STUDENT IN AN ORGANIZED HEALTH CARE EDUCATION/TRAINING PROGRAM | Admitting: STUDENT IN AN ORGANIZED HEALTH CARE EDUCATION/TRAINING PROGRAM

## 2023-03-17 VITALS
OXYGEN SATURATION: 99 % | BODY MASS INDEX: 21.63 KG/M2 | HEART RATE: 100 BPM | TEMPERATURE: 97.8 F | DIASTOLIC BLOOD PRESSURE: 83 MMHG | WEIGHT: 129.85 LBS | HEIGHT: 65 IN | SYSTOLIC BLOOD PRESSURE: 126 MMHG | RESPIRATION RATE: 20 BRPM

## 2023-03-17 DIAGNOSIS — R10.84 GENERALIZED ABDOMINAL PAIN: ICD-10-CM

## 2023-03-17 DIAGNOSIS — K82.8 BILIARY DYSKINESIA: Primary | ICD-10-CM

## 2023-03-17 PROCEDURE — S2900 ROBOTIC SURGICAL SYSTEM: HCPCS | Performed by: STUDENT IN AN ORGANIZED HEALTH CARE EDUCATION/TRAINING PROGRAM

## 2023-03-17 PROCEDURE — 25010000002 DEXAMETHASONE PER 1 MG: Performed by: NURSE ANESTHETIST, CERTIFIED REGISTERED

## 2023-03-17 PROCEDURE — 25010000002 PROPOFOL 200 MG/20ML EMULSION: Performed by: NURSE ANESTHETIST, CERTIFIED REGISTERED

## 2023-03-17 PROCEDURE — 25010000002 FENTANYL CITRATE (PF) 100 MCG/2ML SOLUTION: Performed by: NURSE ANESTHETIST, CERTIFIED REGISTERED

## 2023-03-17 PROCEDURE — 25010000002 ONDANSETRON PER 1 MG: Performed by: NURSE ANESTHETIST, CERTIFIED REGISTERED

## 2023-03-17 PROCEDURE — 25010000002 CEFAZOLIN PER 500 MG: Performed by: STUDENT IN AN ORGANIZED HEALTH CARE EDUCATION/TRAINING PROGRAM

## 2023-03-17 PROCEDURE — 47562 LAPAROSCOPIC CHOLECYSTECTOMY: CPT | Performed by: STUDENT IN AN ORGANIZED HEALTH CARE EDUCATION/TRAINING PROGRAM

## 2023-03-17 PROCEDURE — 25010000002 HYDROMORPHONE 1 MG/ML SOLUTION: Performed by: NURSE ANESTHETIST, CERTIFIED REGISTERED

## 2023-03-17 PROCEDURE — 25010000002 MIDAZOLAM PER 1 MG: Performed by: NURSE ANESTHETIST, CERTIFIED REGISTERED

## 2023-03-17 DEVICE — CLIP LIG HEMOLOK PA LG 6CT PRP: Type: IMPLANTABLE DEVICE | Site: ABDOMEN | Status: FUNCTIONAL

## 2023-03-17 RX ORDER — MIDAZOLAM HYDROCHLORIDE 1 MG/ML
INJECTION INTRAMUSCULAR; INTRAVENOUS AS NEEDED
Status: DISCONTINUED | OUTPATIENT
Start: 2023-03-17 | End: 2023-03-17 | Stop reason: SURG

## 2023-03-17 RX ORDER — PROMETHAZINE HYDROCHLORIDE 25 MG/1
25 TABLET ORAL ONCE AS NEEDED
Status: DISCONTINUED | OUTPATIENT
Start: 2023-03-17 | End: 2023-03-17 | Stop reason: HOSPADM

## 2023-03-17 RX ORDER — ONDANSETRON 2 MG/ML
4 INJECTION INTRAMUSCULAR; INTRAVENOUS ONCE AS NEEDED
Status: DISCONTINUED | OUTPATIENT
Start: 2023-03-17 | End: 2023-03-17 | Stop reason: HOSPADM

## 2023-03-17 RX ORDER — EPHEDRINE SULFATE 50 MG/ML
5 INJECTION, SOLUTION INTRAVENOUS ONCE AS NEEDED
Status: DISCONTINUED | OUTPATIENT
Start: 2023-03-17 | End: 2023-03-17 | Stop reason: HOSPADM

## 2023-03-17 RX ORDER — BUPIVACAINE HYDROCHLORIDE AND EPINEPHRINE 5; 5 MG/ML; UG/ML
INJECTION, SOLUTION EPIDURAL; INTRACAUDAL; PERINEURAL AS NEEDED
Status: DISCONTINUED | OUTPATIENT
Start: 2023-03-17 | End: 2023-03-17 | Stop reason: HOSPADM

## 2023-03-17 RX ORDER — ACETAMINOPHEN 325 MG/1
650 TABLET ORAL ONCE AS NEEDED
Status: DISCONTINUED | OUTPATIENT
Start: 2023-03-17 | End: 2023-03-17 | Stop reason: HOSPADM

## 2023-03-17 RX ORDER — NALOXONE HCL 0.4 MG/ML
0.4 VIAL (ML) INJECTION AS NEEDED
Status: DISCONTINUED | OUTPATIENT
Start: 2023-03-17 | End: 2023-03-17 | Stop reason: HOSPADM

## 2023-03-17 RX ORDER — FLUMAZENIL 0.1 MG/ML
0.2 INJECTION INTRAVENOUS AS NEEDED
Status: DISCONTINUED | OUTPATIENT
Start: 2023-03-17 | End: 2023-03-17 | Stop reason: HOSPADM

## 2023-03-17 RX ORDER — MEPERIDINE HYDROCHLORIDE 50 MG/ML
12.5 INJECTION INTRAMUSCULAR; INTRAVENOUS; SUBCUTANEOUS
Status: DISCONTINUED | OUTPATIENT
Start: 2023-03-17 | End: 2023-03-17 | Stop reason: HOSPADM

## 2023-03-17 RX ORDER — FENTANYL CITRATE 50 UG/ML
INJECTION, SOLUTION INTRAMUSCULAR; INTRAVENOUS AS NEEDED
Status: DISCONTINUED | OUTPATIENT
Start: 2023-03-17 | End: 2023-03-17 | Stop reason: SURG

## 2023-03-17 RX ORDER — VECURONIUM BROMIDE 1 MG/ML
INJECTION, POWDER, LYOPHILIZED, FOR SOLUTION INTRAVENOUS AS NEEDED
Status: DISCONTINUED | OUTPATIENT
Start: 2023-03-17 | End: 2023-03-17 | Stop reason: SURG

## 2023-03-17 RX ORDER — EPHEDRINE SULFATE 50 MG/ML
INJECTION INTRAVENOUS AS NEEDED
Status: DISCONTINUED | OUTPATIENT
Start: 2023-03-17 | End: 2023-03-17 | Stop reason: SURG

## 2023-03-17 RX ORDER — DEXAMETHASONE SODIUM PHOSPHATE 4 MG/ML
INJECTION, SOLUTION INTRA-ARTICULAR; INTRALESIONAL; INTRAMUSCULAR; INTRAVENOUS; SOFT TISSUE AS NEEDED
Status: DISCONTINUED | OUTPATIENT
Start: 2023-03-17 | End: 2023-03-17 | Stop reason: SURG

## 2023-03-17 RX ORDER — ACETAMINOPHEN 650 MG/1
650 SUPPOSITORY RECTAL ONCE AS NEEDED
Status: DISCONTINUED | OUTPATIENT
Start: 2023-03-17 | End: 2023-03-17 | Stop reason: HOSPADM

## 2023-03-17 RX ORDER — BUPIVACAINE HCL/0.9 % NACL/PF 0.1 %
2 PLASTIC BAG, INJECTION (ML) EPIDURAL ONCE
Status: COMPLETED | OUTPATIENT
Start: 2023-03-17 | End: 2023-03-17

## 2023-03-17 RX ORDER — HYDROCODONE BITARTRATE AND ACETAMINOPHEN 5; 325 MG/1; MG/1
1 TABLET ORAL EVERY 6 HOURS PRN
Qty: 16 TABLET | Refills: 0 | Status: SHIPPED | OUTPATIENT
Start: 2023-03-17

## 2023-03-17 RX ORDER — DIPHENHYDRAMINE HYDROCHLORIDE 50 MG/ML
12.5 INJECTION INTRAMUSCULAR; INTRAVENOUS
Status: DISCONTINUED | OUTPATIENT
Start: 2023-03-17 | End: 2023-03-17 | Stop reason: HOSPADM

## 2023-03-17 RX ORDER — PROMETHAZINE HYDROCHLORIDE 25 MG/1
25 SUPPOSITORY RECTAL ONCE AS NEEDED
Status: DISCONTINUED | OUTPATIENT
Start: 2023-03-17 | End: 2023-03-17 | Stop reason: HOSPADM

## 2023-03-17 RX ORDER — HYDROCODONE BITARTRATE AND ACETAMINOPHEN 5; 325 MG/1; MG/1
1 TABLET ORAL ONCE AS NEEDED
Status: COMPLETED | OUTPATIENT
Start: 2023-03-17 | End: 2023-03-17

## 2023-03-17 RX ORDER — SODIUM CHLORIDE, SODIUM GLUCONATE, SODIUM ACETATE, POTASSIUM CHLORIDE AND MAGNESIUM CHLORIDE 526; 502; 368; 37; 30 MG/100ML; MG/100ML; MG/100ML; MG/100ML; MG/100ML
1000 INJECTION, SOLUTION INTRAVENOUS CONTINUOUS PRN
Status: DISCONTINUED | OUTPATIENT
Start: 2023-03-17 | End: 2023-03-17 | Stop reason: HOSPADM

## 2023-03-17 RX ORDER — ONDANSETRON 2 MG/ML
INJECTION INTRAMUSCULAR; INTRAVENOUS AS NEEDED
Status: DISCONTINUED | OUTPATIENT
Start: 2023-03-17 | End: 2023-03-17 | Stop reason: SURG

## 2023-03-17 RX ORDER — INDOCYANINE GREEN AND WATER 25 MG
5 KIT INJECTION ONCE
Status: COMPLETED | OUTPATIENT
Start: 2023-03-17 | End: 2023-03-17

## 2023-03-17 RX ORDER — PROPOFOL 10 MG/ML
INJECTION, EMULSION INTRAVENOUS AS NEEDED
Status: DISCONTINUED | OUTPATIENT
Start: 2023-03-17 | End: 2023-03-17 | Stop reason: SURG

## 2023-03-17 RX ORDER — LIDOCAINE HYDROCHLORIDE 20 MG/ML
INJECTION, SOLUTION EPIDURAL; INFILTRATION; INTRACAUDAL; PERINEURAL AS NEEDED
Status: DISCONTINUED | OUTPATIENT
Start: 2023-03-17 | End: 2023-03-17 | Stop reason: SURG

## 2023-03-17 RX ADMIN — VECURONIUM BROMIDE 6 MG: 1 INJECTION, POWDER, LYOPHILIZED, FOR SOLUTION INTRAVENOUS at 09:42

## 2023-03-17 RX ADMIN — HYDROMORPHONE HYDROCHLORIDE 0.5 MG: 1 INJECTION, SOLUTION INTRAMUSCULAR; INTRAVENOUS; SUBCUTANEOUS at 11:31

## 2023-03-17 RX ADMIN — INDOCYANINE GREEN AND WATER 5 MG: KIT at 08:45

## 2023-03-17 RX ADMIN — FENTANYL CITRATE 50 MCG: 50 INJECTION, SOLUTION INTRAMUSCULAR; INTRAVENOUS at 10:46

## 2023-03-17 RX ADMIN — MIDAZOLAM HYDROCHLORIDE 2 MG: 1 INJECTION, SOLUTION INTRAMUSCULAR; INTRAVENOUS at 09:32

## 2023-03-17 RX ADMIN — LIDOCAINE HYDROCHLORIDE 80 MG: 20 INJECTION, SOLUTION EPIDURAL; INFILTRATION; INTRACAUDAL at 09:42

## 2023-03-17 RX ADMIN — EPHEDRINE SULFATE 10 MG: 50 INJECTION INTRAVENOUS at 10:02

## 2023-03-17 RX ADMIN — SODIUM CHLORIDE, SODIUM GLUCONATE, SODIUM ACETATE, POTASSIUM CHLORIDE AND MAGNESIUM CHLORIDE 1000 ML: 526; 502; 368; 37; 30 INJECTION, SOLUTION INTRAVENOUS at 08:29

## 2023-03-17 RX ADMIN — PROPOFOL 200 MG: 10 INJECTION, EMULSION INTRAVENOUS at 09:42

## 2023-03-17 RX ADMIN — Medication 2 G: at 09:44

## 2023-03-17 RX ADMIN — ONDANSETRON 4 MG: 2 INJECTION INTRAMUSCULAR; INTRAVENOUS at 10:30

## 2023-03-17 RX ADMIN — HYDROMORPHONE HYDROCHLORIDE 0.5 MG: 1 INJECTION, SOLUTION INTRAMUSCULAR; INTRAVENOUS; SUBCUTANEOUS at 11:16

## 2023-03-17 RX ADMIN — FENTANYL CITRATE 50 MCG: 50 INJECTION, SOLUTION INTRAMUSCULAR; INTRAVENOUS at 09:38

## 2023-03-17 RX ADMIN — HYDROCODONE BITARTRATE AND ACETAMINOPHEN 1 TABLET: 5; 325 TABLET ORAL at 12:32

## 2023-03-17 RX ADMIN — HYDROMORPHONE HYDROCHLORIDE 0.5 MG: 1 INJECTION, SOLUTION INTRAMUSCULAR; INTRAVENOUS; SUBCUTANEOUS at 11:41

## 2023-03-17 RX ADMIN — SUGAMMADEX 200 MG: 100 INJECTION, SOLUTION INTRAVENOUS at 10:41

## 2023-03-17 RX ADMIN — DEXAMETHASONE SODIUM PHOSPHATE 4 MG: 4 INJECTION, SOLUTION INTRAMUSCULAR; INTRAVENOUS at 09:47

## 2023-03-17 NOTE — INTERVAL H&P NOTE
H&P reviewed. The patient was examined and there are no changes to the H&P.      Vitals:    03/17/23 0819   BP: 141/87   Pulse: 82   Resp: 18   Temp: 97 °F (36.1 °C)   SpO2: 100%      I have counseled the patient about the nature of the problem, the natural history of the disease, the risk and benefits of surgery, the expected recovery, and the alternatives to surgery.  After this discussion, they were given an opportunity to ask questions, have an understanding of diagnosis and treatment options, and wish to proceed with surgery.

## 2023-03-17 NOTE — ANESTHESIA POSTPROCEDURE EVALUATION
Patient: Christian Gutierrez    Procedure Summary     Date: 03/17/23 Room / Location: [facility] OR 09 / [facility] OR    Anesthesia Start: 0934 Anesthesia Stop: 1056    Procedure: ROBOTIC ASSISTED CHOLECYSTECTOMY, POSSIBLE OPEN, POSSIBLE INTRAOPERATIVE CHOLANGIOGRAM                  (C-ARM) (Abdomen) Diagnosis:       Generalized abdominal pain      (Generalized abdominal pain [R10.84])    Surgeons: Terell Croft MD Provider: Sharda Mcneil CRNA    Anesthesia Type: general ASA Status: 3          Anesthesia Type: general    Vitals  No vitals data found for the desired time range.          Post Anesthesia Care and Evaluation    Patient location during evaluation: PACU  Patient participation: complete - patient cannot participate  Level of consciousness: responsive to painful stimuli  Pain score: 0  Pain management: adequate    Airway patency: patent  Anesthetic complications: No anesthetic complications  PONV Status: none  Cardiovascular status: acceptable  Respiratory status: acceptable and oral airway  Hydration status: acceptable

## 2023-03-17 NOTE — ANESTHESIA PROCEDURE NOTES
Airway  Urgency: elective    Date/Time: 3/17/2023 9:44 AM  Airway not difficult    General Information and Staff    Patient location during procedure: OR  CRNA/CAA: Sharda Mcneil CRNA  SRNA: Eliza Gan SRNA  Indications and Patient Condition  Indications for airway management: airway protection    Preoxygenated: yes  MILS maintained throughout  Mask difficulty assessment: 1 - vent by mask    Final Airway Details  Final airway type: endotracheal airway      Successful airway: ETT  Cuffed: yes   Successful intubation technique: direct laryngoscopy  Endotracheal tube insertion site: oral  Blade: Florecita  Blade size: 3  ETT size (mm): 7.5  Cormack-Lehane Classification: grade I - full view of glottis  Placement verified by: chest auscultation and capnometry   Cuff volume (mL): 10  Measured from: lips  ETT/EBT  to lips (cm): 22  Number of attempts at approach: 1  Assessment: lips, teeth, and gum same as pre-op and atraumatic intubation

## 2023-03-17 NOTE — ANESTHESIA PREPROCEDURE EVALUATION
Anesthesia Evaluation     Patient summary reviewed and Nursing notes reviewed   no history of anesthetic complications:  NPO Solid Status: > 8 hours  NPO Liquid Status: > 8 hours           Airway   Mallampati: II  TM distance: <3 FB  Neck ROM: full  Anterior  Dental    (+) poor dentition    Pulmonary     breath sounds clear to auscultation  (-) asthma, shortness of breath, sleep apnea, not a smoker  Cardiovascular   Exercise tolerance: excellent (>7 METS)    Rhythm: regular  Rate: normal    (-) hypertension, dysrhythmias, angina, murmur, DVT      Neuro/Psych  (-) seizures, TIA, CVA  GI/Hepatic/Renal/Endo    (+)  GERD well controlled,    (-) liver disease, no renal disease, diabetes    Musculoskeletal     Abdominal   (-) obese   Substance History   (-) alcohol use, drug use     OB/GYN          Other                        Anesthesia Plan    ASA 3     general     (Used  at 0820 pt understands the risks and benefits and wishes to proceed with anesthesia. )  intravenous induction     Anesthetic plan, risks, benefits, and alternatives have been provided, discussed and informed consent has been obtained with: patient (sister in law ).        CODE STATUS:

## 2023-03-17 NOTE — OP NOTE
CHOLECYSTECTOMY LAPAROSCOPIC WITH DAVINCI ROBOT  Procedure Note    Christian Gutierrez  3/17/2023    Pre-op Diagnosis:   Generalized abdominal pain [R10.84]    Post-op Diagnosis:     Post-Op Diagnosis Codes:     * Generalized abdominal pain [R10.84]    Procedure/CPT® Codes:      Procedure(s):  ROBOTIC ASSISTED CHOLECYSTECTOMY, POSSIBLE OPEN, POSSIBLE INTRAOPERATIVE CHOLANGIOGRAM                  (C-ARM)    Surgeon(s):  Terell Croft MD    Anesthesia: General    Staff:   Circulator: Nova Wick RN; Eliza Rojas RN  Scrub Person: Ramiro Phan  Endo Technician: Sarah Brunner CST  Assistant: Rox Chavez    Assistant: Rox Chavez was responsible for performing the following activities: Retraction, Suction, Irrigation, Suturing, Closing, Placing Dressing and Held/Positioned Camera and their skilled assistance was necessary for the success of this case.     Estimated Blood Loss: minimal    Specimens:                ID Type Source Tests Collected by Time   A (Not marked as sent) :  Tissue Gallbladder TISSUE EXAM, [facility] ([facility], [facility], [facility]) Terell Croft MD 3/17/2023 0959         Drains: * No LDAs found *    Findings: intrahepatic gallbladder     Complications: none    INDICATION:  This is a 32-year-old with epigastric and right upper quadrant abdominal pain; negative  ultrasound for stones. positive HIDA scan. Taken to the operating room for robotic assisted cholecystectomy.    DESCRIPTION:  The patient was brought to the operating room and placed supine on the operating table. After adequate general endotracheal anesthesia, the abdominal area was prepped and draped in a sterile manner. A briefing and timeout were performed and all parties were in agreement.     A left subcostal transverse incision was made in the midclavicular line approximately 2cm caudally from the costal margin. A 8 mm robotic optical trocar was uneventfully passed into the abdomen under direct vision with no  visceral injury. The abdomen was insufflated to a total of 15 mmHg, 4.8 L of CO2. A 5 mm laparoscope revealed an excellent view of the upper and lower abdominal areas. Three additional robotic trocars were then placed under direct visualization triangulating the gallbladder in the right upper quadrant. The bed was then tilted in reverse Trendelenburg and tipped to the left. The patient tolerated the positioning and insufflation without difficulty. The robot was then docked.    The gallbladder was retracted upwards and outwards by grasping the top and the infundibulum of the gallbladder with a ProGrasp passed through the 4th port. The peritoneum around the infundibulum was taken down for a distance of 1/3 of the length of the gallbladder on the anterior and posterior sides. The cystic duct and artery easily came into view as did the 5th segment of the liver behind these structures.     The cystic duct was doubly clipped and divided. The cystic artery was doubly clipped and divided in all branches. The gallbladder was then dissected out of the liver bed using electrocautery. Once it had been nearly completely excised, pressure in the abdomen was reduced to 8 mmHg with no further bleeding being noted from the liver bed. The remainder of the gallbladder was dissected free and brought out through one of the port sites    All areas were visualized for bleeding and bile leak. None was seen. The patient was then placed supine. The remainder of the trocars were removed and the abdomen was allowed to flatten. All port sites were closed with 4-0 subcuticular on the skin.    The procedure was terminated. It was well tolerated. Sponge and needle counts were correct, and the patient was transferred to recovery in satisfactory condition.          This document has been electronically signed by Terell Croft MD on March 17, 2023 10:49 CDT             Date: 3/17/2023  Time: 10:49 CDT

## 2023-03-20 LAB — REF LAB TEST METHOD: NORMAL

## 2023-03-27 ENCOUNTER — OFFICE VISIT (OUTPATIENT)
Dept: GASTROENTEROLOGY | Facility: CLINIC | Age: 33
End: 2023-03-27

## 2023-03-27 VITALS
HEART RATE: 103 BPM | SYSTOLIC BLOOD PRESSURE: 132 MMHG | WEIGHT: 128.4 LBS | DIASTOLIC BLOOD PRESSURE: 68 MMHG | HEIGHT: 65 IN | BODY MASS INDEX: 21.39 KG/M2

## 2023-03-27 DIAGNOSIS — R19.7 DIARRHEA, UNSPECIFIED TYPE: Primary | ICD-10-CM

## 2023-03-27 DIAGNOSIS — R07.9 CHEST PAIN, UNSPECIFIED TYPE: ICD-10-CM

## 2023-03-27 PROCEDURE — 99214 OFFICE O/P EST MOD 30 MIN: CPT | Performed by: INTERNAL MEDICINE

## 2023-03-27 RX ORDER — DICYCLOMINE HCL 20 MG
20 TABLET ORAL EVERY 6 HOURS
Qty: 120 TABLET | Refills: 5 | Status: SHIPPED | OUTPATIENT
Start: 2023-03-27 | End: 2023-04-26

## 2023-03-27 RX ORDER — OCTISALATE, AVOBENZONE, HOMOSALATE, AND OCTOCRYLENE 29.4; 29.4; 49; 25.48 MG/ML; MG/ML; MG/ML; MG/ML
1 LOTION TOPICAL 2 TIMES DAILY
Qty: 60 CAPSULE | Refills: 6 | Status: SHIPPED | OUTPATIENT
Start: 2023-03-27 | End: 2023-04-26

## 2023-03-30 ENCOUNTER — OFFICE VISIT (OUTPATIENT)
Dept: SURGERY | Facility: CLINIC | Age: 33
End: 2023-03-30

## 2023-03-30 VITALS
OXYGEN SATURATION: 97 % | HEART RATE: 88 BPM | TEMPERATURE: 98.7 F | DIASTOLIC BLOOD PRESSURE: 70 MMHG | SYSTOLIC BLOOD PRESSURE: 126 MMHG | HEIGHT: 65 IN | WEIGHT: 130 LBS | BODY MASS INDEX: 21.66 KG/M2

## 2023-03-30 DIAGNOSIS — K82.8 BILIARY DYSKINESIA: Primary | ICD-10-CM

## 2023-03-30 NOTE — PROGRESS NOTES
CHIEF COMPLAINT:   Chief Complaint   Patient presents with   • Post-op     Robotic lap tg 03/17/2023       HPI: This patient presents for a post-operative visit after undergoing a robotic assisted  cholecystectomy.  Patient reports no problems. Eating well without any significant nausea. Having good bowel function. No problems with constipation or diarrhea. No urinary complaints. Denies fever. Ambulating well and slowly returning to normal activities.    PHYSICAL EXAM:    ABD: Incisions are healing well without any erythema or signs of infection.    ASSESSMENT:    Diagnoses and all orders for this visit:    1. Biliary dyskinesia (Primary)        PLAN:    1.The patient will follow-up on a prn basis unless there are any problems.  2. May shower.   3. May return to normal activity without restrictions.        This document has been electronically signed by Terell Croft MD on March 30, 2023 18:46 CDT

## 2023-04-16 NOTE — PROGRESS NOTES
Chief Complaint   Patient presents with   • 1 month f/u      Reflux        Subjective    Herzon Noah Gutierrez is a 32 y.o. male.    History of Present Illness  Patient presented to GI clinic for follow-up visit today.  He is some better currently.  Abdominal pain and heartburn are well controlled.  Has occasional symptoms of chest pain.  Denies nausea or vomiting.  Has intermittent diarrhea.  Underwent cholecystectomy.  Feels with this.  40 Coactin will return you       The following portions of the patient's history were reviewed and updated as appropriate:   Past Medical History:   Diagnosis Date   • GERD (gastroesophageal reflux disease)      Past Surgical History:   Procedure Laterality Date   • CHOLECYSTECTOMY     • CHOLECYSTECTOMY N/A 3/17/2023    Procedure: ROBOTIC ASSISTED CHOLECYSTECTOMY, POSSIBLE OPEN, POSSIBLE INTRAOPERATIVE CHOLANGIOGRAM                  (C-ARM);  Surgeon: Terell Croft MD;  Location: [facility] OR;  Service: Robotics - DaVinci;  Laterality: N/A;   • COLONOSCOPY N/A 07/12/2022    Procedure: COLONOSCOPY;  Surgeon: Amanda Mcclellan MD;  Location: [facility] ENDOSCOPY;  Service: Gastroenterology;  Laterality: N/A;   • ENDOSCOPY N/A 07/12/2022    Procedure: ESOPHAGOGASTRODUODENOSCOPY;  Surgeon: Amanda Mcclellan MD;  Location: [facility] ENDOSCOPY;  Service: Gastroenterology;  Laterality: N/A;     Family History   Problem Relation Age of Onset   • Alcohol abuse Father    • Liver cancer Maternal Aunt        Prior to Admission medications    Medication Sig Start Date End Date Taking? Authorizing Provider   HYDROcodone-acetaminophen (NORCO) 5-325 MG per tablet Take 1 tablet by mouth Every 6 (Six) Hours As Needed for Pain for up to 16 doses.  Patient not taking: Reported on 3/30/2023 3/17/23  Yes Terell Croft MD   Probiotic Product (PROBIOTIC-10 PO) Take  by mouth Daily.   Yes Provider, MD Juwan   dicyclomine (BENTYL) 20 MG tablet Take 1 tablet by mouth Every 6 (Six) Hours for 30  "days. 3/27/23 4/26/23  Amanda Mcclellan MD   Probiotic Product (Align) 4 MG capsule Take 1 capsule by mouth 2 (Two) Times a Day for 30 days. 3/27/23 4/26/23  Amanda Mcclellan MD     No Known Allergies  Social History     Socioeconomic History   • Marital status: Single   Tobacco Use   • Smoking status: Never   • Smokeless tobacco: Never   Vaping Use   • Vaping Use: Never used   Substance and Sexual Activity   • Alcohol use: Never   • Drug use: Defer   • Sexual activity: Defer       Review of Systems  Review of Systems   Constitutional: Negative for chills, fatigue, fever and unexpected weight change.   HENT: Negative for congestion, ear discharge, hearing loss, nosebleeds and sore throat.    Eyes: Negative for pain, discharge and redness.   Respiratory: Negative for cough, chest tightness, shortness of breath and wheezing.    Cardiovascular: Positive for chest pain. Negative for palpitations.   Gastrointestinal: Positive for diarrhea. Negative for abdominal distention, abdominal pain, blood in stool, constipation, nausea and vomiting.   Endocrine: Negative for cold intolerance, polydipsia, polyphagia and polyuria.   Genitourinary: Negative for dysuria, flank pain, frequency, hematuria and urgency.   Musculoskeletal: Negative for arthralgias, back pain, joint swelling and myalgias.   Skin: Negative for color change, pallor and rash.   Neurological: Negative for tremors, seizures, syncope, weakness and headaches.   Hematological: Negative for adenopathy. Does not bruise/bleed easily.   Psychiatric/Behavioral: Negative for behavioral problems, confusion, dysphoric mood, hallucinations and suicidal ideas. The patient is not nervous/anxious.         /68 (BP Location: Left arm)   Pulse 103   Ht 165.1 cm (65\")   Wt 58.2 kg (128 lb 6.4 oz)   BMI 21.37 kg/m²     Objective    Physical Exam  Constitutional:       Appearance: He is well-developed.   HENT:      Head: Normocephalic and atraumatic.   Eyes:      " Conjunctiva/sclera: Conjunctivae normal.      Pupils: Pupils are equal, round, and reactive to light.   Neck:      Thyroid: No thyromegaly.   Cardiovascular:      Rate and Rhythm: Normal rate and regular rhythm.      Heart sounds: Normal heart sounds. No murmur heard.  Pulmonary:      Effort: Pulmonary effort is normal.      Breath sounds: Normal breath sounds. No wheezing.   Abdominal:      General: Bowel sounds are normal. There is no distension.      Palpations: Abdomen is soft. There is no mass.      Tenderness: There is no abdominal tenderness.      Hernia: No hernia is present.   Genitourinary:     Comments: No lesions noted  Musculoskeletal:         General: No tenderness. Normal range of motion.      Cervical back: Normal range of motion and neck supple.   Lymphadenopathy:      Cervical: No cervical adenopathy.   Skin:     General: Skin is warm and dry.      Findings: No rash.   Neurological:      Mental Status: He is alert and oriented to person, place, and time.      Cranial Nerves: No cranial nerve deficit.   Psychiatric:         Thought Content: Thought content normal.       Admission on 2023, Discharged on 2023   Component Date Value Ref Range Status   • Reference Lab Report 2023    Final                    Value:[facility]  [address] [place] [address]  Phone: [phone] or [phone]  Fax: [phone]  Sergio Leyva M.D., Medical Director    PATIENT NAME                           LABORATORY NO.  1800  NIKKI RAMIREZ         [id number]  [id number]                         AGE              SEX  SSN           CLIENT REF #  [facility]           32      1990                    [id number]    [place]                       REQUESTING M.D.     ATTENDING [place] [address]             DATE COLLECTED      DATE RECEIVED      DATE  REPORTED  03/17/2023 03/17/2023 03/20/2023    DIAGNOSIS:  GALLBLADDER:  Chronic cholecystitis with cholesterolosis  Negative for dysplasia and neoplasm    CLINICAL HISTORY:  Generalized abdominal pain    SPECIMENS RECEIVED:  GALLBLADDER    MICROSCOPIC DESCRIPTION:  Tissue blocks are prepared and slides                           are examined microscopically on all  specimens. See diagnosis for details.    Professional interpretation rendered by Edwin Craig M.D., [facility] at [facility], [address] [place] [address].    GROSS DESCRIPTION:  Received in formalin labeled gallbladder is a 4.5 x 2.4 x 0.9 cm gallbladder with  minimal surgical disruption.  The serosa is green and wrinkled.  The lumen  contains a moderate amount of green, viscous bile.  No stones are identified.  The mucosa is velvety and dark green with scattered yellow stippling.  The  gallbladder wall thickness averages 0.2 cm.  Representative sections are  submitted in a single cassette to include en face cystic duct margin, neck, body  and fundus.  LDP    REVIEWED, DIAGNOSED AND ELECTRONICALLY  SIGNED BY:    Edwin Craig M.D., JENNA.[email or web address].HAWA.  CPT CODES:  34715       Assessment & Plan    No diagnosis found..   1.  Abdominal pain with nausea and vomiting, resolved with cholecystectomy.  2.  Diarrhea, likely due to cholecystectomy.  Increase Bentyl to 20 mg p.o. 4 times daily and add probiotics..  3.  Chest pain, likely due to GERD and dysmotility.  Continue Prilosec.  Increase Bentyl to 20 mg.  EGD with [facility] if continues.  4.  Weight loss, improving.  5.  History of alcohol usage, off currently.  Patient to continue alcohol abstinence.    Orders placed during this encounter include:  No orders of the defined types were placed in this encounter.      * Surgery not found *    Review and/or summary of lab tests, radiology, procedures, medications. Review and summary of old records and obtaining of history. The risks and  benefits of my recommendations, as well as other treatment options were discussed with the patient today. Questions were answered.    New Medications Ordered This Visit   Medications   • dicyclomine (BENTYL) 20 MG tablet     Sig: Take 1 tablet by mouth Every 6 (Six) Hours for 30 days.     Dispense:  120 tablet     Refill:  5   • Probiotic Product (Align) 4 MG capsule     Sig: Take 1 capsule by mouth 2 (Two) Times a Day for 30 days.     Dispense:  60 capsule     Refill:  6       Follow-up: Return in about 2 months (around 2023).               Results for orders placed or performed during the hospital encounter of 23   TISSUE EXAM, [facility] ([facility])    Specimen: Gallbladder; Tissue   Result Value Ref Range    Reference Lab Report       [facility]  [address] [place] [address]  Phone: [phone] or [phone]  Fax: [phone]  Sergio Leyva M.D., Medical Director    PATIENT NAME                           LABORATORY NO.  1800  NIKKI RAMIREZI         [id number]  [id number]                         AGE              SEX  SSN           CLIENT REF #  [facility]           32      1990                    [id number]    [place]                       REQUESTING M.D.     ATTENDING [place] [address]             DATE COLLECTED      DATE RECEIVED      DATE REPORTED  2023    DIAGNOSIS:  GALLBLADDER:  Chronic cholecystitis with cholesterolosis  Negative for dysplasia and neoplasm    CLINICAL HISTORY:  Generalized abdominal pain    SPECIMENS RECEIVED:  GALLBLADDER    MICROSCOPIC DESCRIPTION:  Tissue blocks are prepared and slides  are examined microscopically on all  specimens. See diagnosis for details.    Professional interpretation rendered by Edwin Craig M.D., F.C.A.P. at [facility], [place], [address] [place] [address]  [place] [address].    GROSS DESCRIPTION:  Received in formalin labeled gallbladder is a 4.5 x 2.4 x 0.9 cm gallbladder with  minimal surgical disruption.  The serosa is green and wrinkled.  The lumen  contains a moderate amount of green, viscous bile.  No stones are identified.  The mucosa is velvety and dark green with scattered yellow stippling.  The  gallbladder wall thickness averages 0.2 cm.  Representative sections are  submitted in a single cassette to include en face cystic duct margin, neck, body  and fundus.  LDP    REVIEWED, DIAGNOSED AND ELECTRONICALLY  SIGNED BY:    Edwin Craig M.D., F.C.A.P.  CPT CODES:  14993     Results for orders placed or performed during the hospital encounter of 22   TISSUE EXAM, [facility] ([facility])    Specimen: A: Small Intestine, Duodenum; Tissue    B: Gastric, Antrum; Tissue    C: Esophagus; Tissue    D: Small Intestine, Ileum; Tissue    E: Large Intestine; Tissue    F: Large Intestine, Rectum; Tissue   Result Value Ref Range    Reference Lab Report       [facility]  [address] [place] [address]  Phone: [phone] or [phone]  Fax: [phone]  Sergio Leyva M.D., Medical Director    PATIENT NAME                           LABORATORY NO.  1800  NIKKI RAMIREZ         [id number]  [id number]                         AGE              SEX  SSN           CLIENT REF #  [facility]           31      1990                    [id number]    [place]                       REQUESTING M.D.     ATTENDING M.D.     COPY [address]  DATE COLLECTED      DATE RECEIVED      DATE REPORTED  2022          2022         07/15/2022    DIAGNOSIS:  A.   SMALL BOWEL, BIOPSY:  Small bowel mucosa with no significant pathologic abnormality  B.   ANTRUM, BIOPSY:  Helicobacter pylori associated chronic gastritis  "with reactive gastropathy  H. pylori immunohistochemical stain is positive for  organisms  No intestinal metaplasia or dysplasia identified  C.   GE JUNCTION:  Helicobacter pylori associated chronic gastritis with reactive gastropathy  H. pylori immunohistochemical stain is positive for organisms  No intestinal metaplasia or dysplasia identified  No squamous mucosa identified  D.   TERMINAL ILEUM BIOPSY:  Small bowel mucosa with no significant pathologic abnormality  E.   COLON, BIOPSY:  Colonic mucosa with no significant pathologic abnormality  F.   RECTAL BIOPSY:  Rectal type mucosa with mild reactive changes; otherwise, no significant  pathologic abnormality    CLINICAL HISTORY:  Generalized abdominal pain, diarrhea, nausea, hemorrhage of anus and rectum    SPECIMENS RECEIVED:  A.  SMALL BOWEL, BIOPSY  B.  ANTRUM, BIOPSY  C.  GE JUNCTION  D.  TERMINAL ILEUM BIOPSY  E.  COLON, BIOPSY  F.  RECTAL BIOPSY    MICROSCOPIC DESCRIPTION:  Tissue blocks are prepared and slides are examined microscopically on all  specimens. See diagnosis for details.    The internal and  external (both positive and negative) controls reacted  appropriately. Some of our immunohistochemical and in situ hybridization  studies are performed as analyte specific reagents. The following statement  applies to those tests: This test was developed, and its performance  characteristics determined by [facility]. It has not been  cleared or approved by the [place] Food and Drug Administration. However, the  FDA has determined that approval and clearance are not necessary.    Professional interpretation rendered by Susan Del Rio D.O., F.C.A.P. at  [facility], [facility], [address] [place] [address].    GROSS DESCRIPTION:  A.  Specimen is received in 1 formalin filled container labeled \"small bowel  biopsy\" consists of 2 pieces of tan soft tissue measuring 0.5 x 0.3 x 0.2 cm  in aggregate.  All submitted in 1 cassette.  COLIN LERMA  " "Specimen received in 1 formalin filled container labeled \"antrum biopsy\"  consists of 3 pieces of tan soft tissue measuring 0.6 x 0.3  x 0.2 cm in  aggregate.  All submitted in 1 cassette.  C.  Specimen is received in 1 formalin filled container labeled \"GE junction  biopsy\" consists of 2 pieces of tan soft tissue measuring 0.6 x 0.3 x 0.3 cm  in aggregate.  All submitted in 1 cassette.  D.  Specimen is received in 1 formalin filled container labeled \"terminal ileum  biopsy\" consists of 2 pieces of tan soft tissue measuring 0.7 x 0.3 x 0.2 cm  in aggregate.  All submitted in 1 cassette.  E.  Specimen received in 1 formalin filled container labeled \"colonic mucosa  biopsy\" consists of 3 pieces of tan soft tissue measuring 0.7 x 0.3 x 0.2 cm  in aggregate.  All submitted in 1 cassette.  F.  Specimen received in 1 formalin filled container labeled \"rectum biopsy\"  consists of 2 pieces of tan soft tissue measuring 0.4 x 0.3 x 0.2 cm in  aggregate.  All submitted in 1 cassette.  BKO    REVIEWED, DIAGNOSED AND ELECTRONICALLY  SIGNED BY:    Susan Del Rio D.O., F.C.A.P.  CPT CODES:  88305x6, 88342x2     Results for orders placed or performed during the hospital encounter of 06/07/22   Gold Top - SST   Result Value Ref Range    Extra Tube Hold for add-ons.    Green Top (Gel)   Result Value Ref Range    Extra Tube Hold for add-ons.    Urinalysis, Microscopic Only - Urine, Clean Catch    Specimen: Urine, Clean Catch   Result Value Ref Range    RBC, UA 3-5 (A) None Seen /HPF    WBC, UA 0-2 None Seen, 0-2, 3-5 /HPF    Bacteria, UA None Seen None Seen /HPF    Squamous Epithelial Cells, UA 0-2 None Seen, 0-2 /HPF    Hyaline Casts, UA None Seen None Seen /LPF    Methodology Automated Microscopy    Urinalysis With Microscopic If Indicated (No Culture) - Urine, Clean Catch    Specimen: Urine, Clean Catch   Result Value Ref Range    Color, UA Yellow Yellow, Straw, Dark Yellow, Nini    Appearance, UA Clear Clear    pH, UA <=5.0 " 5.0 - 9.0    Specific Gravity, UA 1.020 1.003 - 1.030    Glucose, UA Negative Negative    Ketones, UA Negative Negative    Bilirubin, UA Negative Negative    Blood, UA Moderate (2+) (A) Negative    Protein, UA Negative Negative    Leuk Esterase, UA Negative Negative    Nitrite, UA Negative Negative    Urobilinogen, UA 1.0 E.U./dL 0.2 - 1.0 E.U./dL   CBC Auto Differential    Specimen: Blood   Result Value Ref Range    WBC 9.25 3.40 - 10.80 10*3/mm3    RBC 4.89 4.14 - 5.80 10*6/mm3    Hemoglobin 14.8 13.0 - 17.7 g/dL    Hematocrit 43.0 37.5 - 51.0 %    MCV 87.9 79.0 - 97.0 fL    MCH 30.3 26.6 - 33.0 pg    MCHC 34.4 31.5 - 35.7 g/dL    RDW 12.5 12.3 - 15.4 %    RDW-SD 40.4 37.0 - 54.0 fl    MPV 9.7 6.0 - 12.0 fL    Platelets 282 140 - 450 10*3/mm3    Neutrophil % 57.9 42.7 - 76.0 %    Lymphocyte % 30.1 19.6 - 45.3 %    Monocyte % 8.9 5.0 - 12.0 %    Eosinophil % 2.1 0.3 - 6.2 %    Basophil % 0.5 0.0 - 1.5 %    Immature Grans % 0.5 0.0 - 0.5 %    Neutrophils, Absolute 5.36 1.70 - 7.00 10*3/mm3    Lymphocytes, Absolute 2.78 0.70 - 3.10 10*3/mm3    Monocytes, Absolute 0.82 0.10 - 0.90 10*3/mm3    Eosinophils, Absolute 0.19 0.00 - 0.40 10*3/mm3    Basophils, Absolute 0.05 0.00 - 0.20 10*3/mm3    Immature Grans, Absolute 0.05 0.00 - 0.05 10*3/mm3    nRBC 0.0 0.0 - 0.2 /100 WBC   Lavender Top   Result Value Ref Range    Extra Tube hold for add-on    Light Blue Top   Result Value Ref Range    Extra Tube Hold for add-ons.    Lipase    Specimen: Blood   Result Value Ref Range    Lipase 30 13 - 60 U/L   Comprehensive Metabolic Panel    Specimen: Blood   Result Value Ref Range    Glucose 98 65 - 99 mg/dL    BUN 14 6 - 20 mg/dL    Creatinine 0.83 0.76 - 1.27 mg/dL    Sodium 142 136 - 145 mmol/L    Potassium 3.7 3.5 - 5.2 mmol/L    Chloride 105 98 - 107 mmol/L    CO2 25.0 22.0 - 29.0 mmol/L    Calcium 9.6 8.6 - 10.5 mg/dL    Total Protein 7.7 6.0 - 8.5 g/dL    Albumin 4.60 3.50 - 5.20 g/dL    ALT (SGPT) 27 1 - 41 U/L    AST (SGOT)  22 1 - 40 U/L    Alkaline Phosphatase 88 39 - 117 U/L    Total Bilirubin 0.2 0.0 - 1.2 mg/dL    Globulin 3.1 gm/dL    A/G Ratio 1.5 g/dL    BUN/Creatinine Ratio 16.9 7.0 - 25.0    Anion Gap 12.0 5.0 - 15.0 mmol/L    eGFR 121.5 >60.0 mL/min/1.73         This document has been electronically signed by Amanda Mcclellan MD on April 16, 2023 14:13 CDT

## (undated) DEVICE — STERILE POLYISOPRENE POWDER-FREE SURGICAL GLOVES WITH EMOLLIENT COATING: Brand: PROTEXIS

## (undated) DEVICE — BITEBLOCK ENDO W/STRAP 60F A/ LF DISP

## (undated) DEVICE — PK LAP CHOLE LF 60

## (undated) DEVICE — SUT MONOCRYL 4/0 PS2 27IN Y426H ETY426H

## (undated) DEVICE — APPL CHLORAPREP HI/LITE 26ML ORNG

## (undated) DEVICE — ENDOPATH XCEL BLADELESS TROCARS WITH STABILITY SLEEVES: Brand: ENDOPATH XCEL

## (undated) DEVICE — DBD-DRAPE,LAP,CHOLE,W/TROUGHS,STERILE: Brand: MEDLINE

## (undated) DEVICE — COLUMN DRAPE

## (undated) DEVICE — TISSUE RETRIEVAL SYSTEM: Brand: INZII RETRIEVAL SYSTEM

## (undated) DEVICE — CANNULA SEAL

## (undated) DEVICE — GLV SURG SENSICARE PI MIC PF SZ7.5 LF STRL

## (undated) DEVICE — STRAP POSTN FM KN/BDY 4X60IN

## (undated) DEVICE — ADHS SKIN PREMIERPRO EXOFIN TOPICAL HI/VISC .5ML

## (undated) DEVICE — ARM DRAPE

## (undated) DEVICE — SYR LUERLOK 30CC

## (undated) DEVICE — PATIENT RETURN ELECTRODE, SINGLE-USE, CONTACT QUALITY MONITORING, ADULT, WITH 15 FT (4.5 M) CORD. FOR PATIENTS WEIGHING OVER 33LBS. (15KG): Brand: MEGADYNE

## (undated) DEVICE — SINGLE-USE BIOPSY FORCEPS: Brand: RADIAL JAW 4

## (undated) DEVICE — TRENDELENBURG WINGPAD POSITIONING KIT DELUXE - WITHOUT BODY STRAP: Brand: SOULE MEDICAL

## (undated) DEVICE — BLADELESS OBTURATOR: Brand: WECK VISTA

## (undated) DEVICE — DEFOGGER!" ANTI FOG KIT: Brand: DEROYAL

## (undated) DEVICE — STERILE POLYISOPRENE POWDER-FREE SURGICAL GLOVES: Brand: PROTEXIS